# Patient Record
Sex: FEMALE | Race: WHITE | NOT HISPANIC OR LATINO | Employment: FULL TIME | URBAN - METROPOLITAN AREA
[De-identification: names, ages, dates, MRNs, and addresses within clinical notes are randomized per-mention and may not be internally consistent; named-entity substitution may affect disease eponyms.]

---

## 2023-03-13 DIAGNOSIS — Z79.4 TYPE 2 DIABETES MELLITUS WITH OTHER SPECIFIED COMPLICATION, WITH LONG-TERM CURRENT USE OF INSULIN (MULTI): Primary | ICD-10-CM

## 2023-03-13 DIAGNOSIS — E11.69 TYPE 2 DIABETES MELLITUS WITH OTHER SPECIFIED COMPLICATION, WITH LONG-TERM CURRENT USE OF INSULIN (MULTI): Primary | ICD-10-CM

## 2023-03-13 RX ORDER — METFORMIN HYDROCHLORIDE 500 MG/1
1 TABLET, EXTENDED RELEASE ORAL 2 TIMES DAILY
COMMUNITY
Start: 2017-07-24

## 2023-03-13 RX ORDER — MIRTAZAPINE 15 MG/1
1 TABLET, FILM COATED ORAL NIGHTLY
COMMUNITY
Start: 2022-04-27 | End: 2023-11-03 | Stop reason: SDUPTHER

## 2023-03-13 RX ORDER — TRAZODONE HYDROCHLORIDE 50 MG/1
TABLET ORAL
COMMUNITY
Start: 2022-04-20

## 2023-03-13 RX ORDER — CLOTRIMAZOLE AND BETAMETHASONE DIPROPIONATE 10; .5 MG/ML; MG/ML
LOTION TOPICAL
COMMUNITY
Start: 2023-01-30

## 2023-03-13 RX ORDER — DULOXETIN HYDROCHLORIDE 30 MG/1
CAPSULE, DELAYED RELEASE ORAL
COMMUNITY
Start: 2023-01-12

## 2023-03-13 RX ORDER — INSULIN LISPRO 100 [IU]/ML
INJECTION, SOLUTION INTRAVENOUS; SUBCUTANEOUS
COMMUNITY
Start: 2021-03-18

## 2023-03-13 RX ORDER — LISINOPRIL 10 MG/1
1 TABLET ORAL DAILY
COMMUNITY
Start: 2017-12-06

## 2023-03-13 RX ORDER — FLUTICASONE FUROATE AND VILANTEROL 200; 25 UG/1; UG/1
POWDER RESPIRATORY (INHALATION)
COMMUNITY
Start: 2020-09-24

## 2023-03-13 RX ORDER — BACITRACIN 500 [USP'U]/G
OINTMENT TOPICAL
COMMUNITY
Start: 2021-03-26

## 2023-03-13 RX ORDER — SERTRALINE HYDROCHLORIDE 50 MG/1
TABLET, FILM COATED ORAL
COMMUNITY
Start: 2023-01-12

## 2023-03-13 RX ORDER — LANOLIN ALCOHOL/MO/W.PET/CERES
1 CREAM (GRAM) TOPICAL DAILY
COMMUNITY
Start: 2020-09-24

## 2023-03-13 RX ORDER — RIMEGEPANT SULFATE 75 MG/75MG
TABLET, ORALLY DISINTEGRATING ORAL
COMMUNITY
Start: 2021-12-10 | End: 2024-03-20

## 2023-03-13 RX ORDER — BUSPIRONE HYDROCHLORIDE 5 MG/1
TABLET ORAL
COMMUNITY
Start: 2022-03-30

## 2023-03-13 RX ORDER — IBUPROFEN 600 MG/1
1 TABLET ORAL 3 TIMES DAILY PRN
COMMUNITY
Start: 2020-01-16 | End: 2023-07-10

## 2023-03-13 RX ORDER — ONDANSETRON 8 MG/1
TABLET, ORALLY DISINTEGRATING ORAL EVERY 8 HOURS
COMMUNITY
Start: 2019-03-14 | End: 2024-01-22

## 2023-03-13 RX ORDER — POTASSIUM CHLORIDE 20 MEQ/1
1 TABLET, EXTENDED RELEASE ORAL DAILY
COMMUNITY
Start: 2020-09-24

## 2023-03-13 RX ORDER — FLUOXETINE HYDROCHLORIDE 20 MG/1
CAPSULE ORAL
COMMUNITY
Start: 2022-04-20

## 2023-03-13 RX ORDER — GABAPENTIN 600 MG/1
1 TABLET ORAL 3 TIMES DAILY
COMMUNITY
Start: 2017-06-22

## 2023-03-13 RX ORDER — NYSTATIN 100000 U/G
OINTMENT TOPICAL
COMMUNITY
Start: 2023-01-13

## 2023-03-13 RX ORDER — INSULIN GLARGINE 100 [IU]/ML
INJECTION, SOLUTION SUBCUTANEOUS
COMMUNITY
Start: 2020-09-30 | End: 2023-12-13

## 2023-03-13 RX ORDER — TRIAMCINOLONE ACETONIDE 1 MG/G
CREAM TOPICAL
COMMUNITY
Start: 2022-08-01

## 2023-03-13 RX ORDER — DULAGLUTIDE 0.75 MG/.5ML
INJECTION, SOLUTION SUBCUTANEOUS
COMMUNITY
Start: 2021-03-18 | End: 2023-03-13 | Stop reason: SDUPTHER

## 2023-03-13 RX ORDER — DULAGLUTIDE 0.75 MG/.5ML
0.75 INJECTION, SOLUTION SUBCUTANEOUS
Qty: 4 EACH | Refills: 0 | Status: SHIPPED
Start: 2023-03-13 | End: 2023-11-03 | Stop reason: ALTCHOICE

## 2023-03-13 RX ORDER — TORSEMIDE 100 MG/1
1 TABLET ORAL DAILY
COMMUNITY
Start: 2022-08-04

## 2023-03-13 RX ORDER — SUMATRIPTAN SUCCINATE 100 MG/1
TABLET ORAL
COMMUNITY
Start: 2021-12-01

## 2023-03-13 RX ORDER — ECONAZOLE NITRATE 10 MG/G
CREAM TOPICAL 2 TIMES DAILY
COMMUNITY
Start: 2022-08-01

## 2023-03-13 RX ORDER — BLOOD-GLUCOSE METER
EACH MISCELLANEOUS
COMMUNITY
Start: 2017-04-04

## 2023-07-10 DIAGNOSIS — S93.419S: Primary | ICD-10-CM

## 2023-07-10 RX ORDER — IBUPROFEN 600 MG/1
TABLET ORAL
Qty: 90 TABLET | Refills: 0 | Status: SHIPPED | OUTPATIENT
Start: 2023-07-10 | End: 2023-11-03 | Stop reason: SDUPTHER

## 2023-08-29 ENCOUNTER — TELEPHONE (OUTPATIENT)
Dept: PRIMARY CARE | Facility: CLINIC | Age: 48
End: 2023-08-29
Payer: COMMERCIAL

## 2023-10-12 DIAGNOSIS — S93.419S: ICD-10-CM

## 2023-10-12 RX ORDER — IBUPROFEN 600 MG/1
TABLET ORAL
Qty: 90 TABLET | Refills: 0 | OUTPATIENT
Start: 2023-10-12

## 2023-11-03 ENCOUNTER — LAB (OUTPATIENT)
Dept: LAB | Facility: LAB | Age: 48
End: 2023-11-03
Payer: COMMERCIAL

## 2023-11-03 ENCOUNTER — OFFICE VISIT (OUTPATIENT)
Dept: PRIMARY CARE | Facility: CLINIC | Age: 48
End: 2023-11-03
Payer: COMMERCIAL

## 2023-11-03 VITALS
DIASTOLIC BLOOD PRESSURE: 84 MMHG | HEIGHT: 63 IN | SYSTOLIC BLOOD PRESSURE: 142 MMHG | WEIGHT: 235 LBS | BODY MASS INDEX: 41.64 KG/M2

## 2023-11-03 DIAGNOSIS — Z13.220 LIPID SCREENING: ICD-10-CM

## 2023-11-03 DIAGNOSIS — I10 BENIGN ESSENTIAL HYPERTENSION: ICD-10-CM

## 2023-11-03 DIAGNOSIS — E11.69 DIABETES MELLITUS TYPE 2 IN OBESE: ICD-10-CM

## 2023-11-03 DIAGNOSIS — N15.9 KIDNEY INFECTION: ICD-10-CM

## 2023-11-03 DIAGNOSIS — E07.9 THYROID DISEASE: ICD-10-CM

## 2023-11-03 DIAGNOSIS — S93.419S: ICD-10-CM

## 2023-11-03 DIAGNOSIS — E66.9 DIABETES MELLITUS TYPE 2 IN OBESE: ICD-10-CM

## 2023-11-03 DIAGNOSIS — N39.0 FREQUENT UTI: Primary | ICD-10-CM

## 2023-11-03 DIAGNOSIS — F32.A MILD DEPRESSION: ICD-10-CM

## 2023-11-03 PROBLEM — I47.10 PAROXYSMAL SUPRAVENTRICULAR TACHYCARDIA (CMS-HCC): Status: ACTIVE | Noted: 2023-11-03

## 2023-11-03 PROBLEM — L30.8 ASTEATOTIC ECZEMA: Status: ACTIVE | Noted: 2023-11-03

## 2023-11-03 PROBLEM — E86.0 DEHYDRATION: Status: ACTIVE | Noted: 2023-11-03

## 2023-11-03 PROBLEM — M75.41 IMPINGEMENT SYNDROME OF RIGHT SHOULDER: Status: ACTIVE | Noted: 2023-11-03

## 2023-11-03 PROBLEM — R07.81 RIB PAIN ON RIGHT SIDE: Status: ACTIVE | Noted: 2023-11-03

## 2023-11-03 PROBLEM — R05.9 COUGH: Status: ACTIVE | Noted: 2023-11-03

## 2023-11-03 PROBLEM — N63.10 LUMP OF BREAST, RIGHT: Status: ACTIVE | Noted: 2023-11-03

## 2023-11-03 PROBLEM — B37.9 YEAST INFECTION: Status: ACTIVE | Noted: 2023-11-03

## 2023-11-03 PROBLEM — A08.4 VIRAL GASTROENTERITIS: Status: ACTIVE | Noted: 2023-11-03

## 2023-11-03 PROBLEM — F43.20 ADJUSTMENT DISORDER: Status: ACTIVE | Noted: 2023-11-03

## 2023-11-03 PROBLEM — I82.409 DVT (DEEP VENOUS THROMBOSIS) (MULTI): Status: ACTIVE | Noted: 2023-11-03

## 2023-11-03 PROBLEM — B35.3 TINEA PEDIS: Status: ACTIVE | Noted: 2023-11-03

## 2023-11-03 PROBLEM — N92.0 MENORRHAGIA: Status: ACTIVE | Noted: 2023-11-03

## 2023-11-03 PROBLEM — M54.2 NECK PAIN: Status: ACTIVE | Noted: 2023-11-03

## 2023-11-03 PROBLEM — J90 BILATERAL PLEURAL EFFUSION: Status: ACTIVE | Noted: 2023-11-03

## 2023-11-03 PROBLEM — E11.42 DIABETIC POLYNEUROPATHY ASSOCIATED WITH TYPE 2 DIABETES MELLITUS (MULTI): Status: ACTIVE | Noted: 2023-11-03

## 2023-11-03 PROBLEM — F41.9 ANXIETY: Status: ACTIVE | Noted: 2023-11-03

## 2023-11-03 PROBLEM — J98.11 ATELECTASIS: Status: ACTIVE | Noted: 2023-11-03

## 2023-11-03 PROBLEM — M54.9 BACK PAIN: Status: ACTIVE | Noted: 2023-11-03

## 2023-11-03 PROBLEM — R06.00 DYSPNEA: Status: ACTIVE | Noted: 2023-11-03

## 2023-11-03 PROBLEM — N93.9 ABNORMAL VAGINAL BLEEDING: Status: ACTIVE | Noted: 2023-11-03

## 2023-11-03 PROBLEM — R60.0 EDEMA OF FOOT: Status: ACTIVE | Noted: 2023-11-03

## 2023-11-03 PROBLEM — N10 ACUTE PYELONEPHRITIS: Status: ACTIVE | Noted: 2023-11-03

## 2023-11-03 PROBLEM — E11.9 DIABETES MELLITUS (MULTI): Status: ACTIVE | Noted: 2023-11-03

## 2023-11-03 PROBLEM — F89 DISABILITY, DEVELOPMENTAL: Status: ACTIVE | Noted: 2023-11-03

## 2023-11-03 PROBLEM — J11.1 INFLUENZA-LIKE ILLNESS: Status: ACTIVE | Noted: 2023-11-03

## 2023-11-03 PROBLEM — M25.512 LEFT SHOULDER PAIN: Status: ACTIVE | Noted: 2020-03-24

## 2023-11-03 PROBLEM — Z86.718 HISTORY OF DEEP VEIN THROMBOSIS: Status: ACTIVE | Noted: 2023-11-03

## 2023-11-03 PROBLEM — R51.9 HEADACHE: Status: ACTIVE | Noted: 2023-11-03

## 2023-11-03 PROBLEM — M75.42 IMPINGEMENT SYNDROME OF LEFT SHOULDER: Status: ACTIVE | Noted: 2023-11-03

## 2023-11-03 PROBLEM — M48.062 SPINAL STENOSIS, LUMBAR REGION WITH NEUROGENIC CLAUDICATION: Status: ACTIVE | Noted: 2020-03-10

## 2023-11-03 PROBLEM — L03.119 CELLULITIS OF LOWER LEG: Status: ACTIVE | Noted: 2023-11-03

## 2023-11-03 PROBLEM — R59.9 LYMPH NODES ENLARGED: Status: ACTIVE | Noted: 2023-11-03

## 2023-11-03 PROBLEM — J18.9 COMMUNITY ACQUIRED PNEUMONIA: Status: ACTIVE | Noted: 2023-11-03

## 2023-11-03 PROBLEM — M79.606 LEG PAIN: Status: ACTIVE | Noted: 2023-11-03

## 2023-11-03 PROBLEM — R93.89 STANDARD CHEST X-RAY ABNORMAL: Status: ACTIVE | Noted: 2023-11-03

## 2023-11-03 PROBLEM — D64.9 ANEMIA: Status: ACTIVE | Noted: 2023-11-03

## 2023-11-03 PROBLEM — G56.00 CARPAL TUNNEL SYNDROME: Status: ACTIVE | Noted: 2023-11-03

## 2023-11-03 PROBLEM — R60.0 EDEMA OF LOWER EXTREMITY: Status: ACTIVE | Noted: 2023-11-03

## 2023-11-03 PROBLEM — M54.12 CERVICAL RADICULITIS: Status: ACTIVE | Noted: 2023-11-03

## 2023-11-03 PROBLEM — R73.09 BLOOD GLUCOSE ABNORMAL: Status: ACTIVE | Noted: 2023-11-03

## 2023-11-03 PROBLEM — R60.9 EDEMA: Status: ACTIVE | Noted: 2023-11-03

## 2023-11-03 PROBLEM — R11.15 PERSISTENT VOMITING: Status: ACTIVE | Noted: 2023-11-03

## 2023-11-03 PROBLEM — G43.909 MIGRAINE: Status: ACTIVE | Noted: 2023-11-03

## 2023-11-03 LAB
ALBUMIN SERPL BCP-MCNC: 4.2 G/DL (ref 3.4–5)
ALP SERPL-CCNC: 155 U/L (ref 33–110)
ALT SERPL W P-5'-P-CCNC: 25 U/L (ref 7–45)
ANION GAP SERPL CALC-SCNC: 13 MMOL/L (ref 10–20)
APPEARANCE UR: CLEAR
AST SERPL W P-5'-P-CCNC: 12 U/L (ref 9–39)
BACTERIA #/AREA URNS AUTO: ABNORMAL /HPF
BILIRUB SERPL-MCNC: 0.3 MG/DL (ref 0–1.2)
BILIRUB UR STRIP.AUTO-MCNC: NEGATIVE MG/DL
BUN SERPL-MCNC: 22 MG/DL (ref 6–23)
CALCIUM SERPL-MCNC: 9.9 MG/DL (ref 8.6–10.6)
CHLORIDE SERPL-SCNC: 100 MMOL/L (ref 98–107)
CO2 SERPL-SCNC: 27 MMOL/L (ref 21–32)
COLOR UR: YELLOW
CREAT SERPL-MCNC: 1.05 MG/DL (ref 0.5–1.05)
ERYTHROCYTE [DISTWIDTH] IN BLOOD BY AUTOMATED COUNT: 12.6 % (ref 11.5–14.5)
EST. AVERAGE GLUCOSE BLD GHB EST-MCNC: 298 MG/DL
GFR SERPL CREATININE-BSD FRML MDRD: 66 ML/MIN/1.73M*2
GLUCOSE SERPL-MCNC: 476 MG/DL (ref 74–99)
GLUCOSE UR STRIP.AUTO-MCNC: ABNORMAL MG/DL
HBA1C MFR BLD: 12 %
HCT VFR BLD AUTO: 38.5 % (ref 36–46)
HGB BLD-MCNC: 12.6 G/DL (ref 12–16)
KETONES UR STRIP.AUTO-MCNC: NEGATIVE MG/DL
LEUKOCYTE ESTERASE UR QL STRIP.AUTO: NEGATIVE
MCH RBC QN AUTO: 28.5 PG (ref 26–34)
MCHC RBC AUTO-ENTMCNC: 32.7 G/DL (ref 32–36)
MCV RBC AUTO: 87 FL (ref 80–100)
NITRITE UR QL STRIP.AUTO: NEGATIVE
NRBC BLD-RTO: 0 /100 WBCS (ref 0–0)
PH UR STRIP.AUTO: 6 [PH]
PLATELET # BLD AUTO: 197 X10*3/UL (ref 150–450)
POTASSIUM SERPL-SCNC: 4.3 MMOL/L (ref 3.5–5.3)
PROT SERPL-MCNC: 7.7 G/DL (ref 6.4–8.2)
PROT UR STRIP.AUTO-MCNC: ABNORMAL MG/DL
RBC # BLD AUTO: 4.42 X10*6/UL (ref 4–5.2)
RBC # UR STRIP.AUTO: NEGATIVE /UL
RBC #/AREA URNS AUTO: ABNORMAL /HPF
SODIUM SERPL-SCNC: 136 MMOL/L (ref 136–145)
SP GR UR STRIP.AUTO: 1.03
SQUAMOUS #/AREA URNS AUTO: ABNORMAL /HPF
TSH SERPL-ACNC: 1.05 MIU/L (ref 0.44–3.98)
UROBILINOGEN UR STRIP.AUTO-MCNC: <2 MG/DL
WBC # BLD AUTO: 6.7 X10*3/UL (ref 4.4–11.3)
WBC #/AREA URNS AUTO: ABNORMAL /HPF

## 2023-11-03 PROCEDURE — 3077F SYST BP >= 140 MM HG: CPT | Performed by: INTERNAL MEDICINE

## 2023-11-03 PROCEDURE — 99214 OFFICE O/P EST MOD 30 MIN: CPT | Performed by: INTERNAL MEDICINE

## 2023-11-03 PROCEDURE — 36415 COLL VENOUS BLD VENIPUNCTURE: CPT

## 2023-11-03 PROCEDURE — 4010F ACE/ARB THERAPY RXD/TAKEN: CPT | Performed by: INTERNAL MEDICINE

## 2023-11-03 PROCEDURE — 84443 ASSAY THYROID STIM HORMONE: CPT

## 2023-11-03 PROCEDURE — 3079F DIAST BP 80-89 MM HG: CPT | Performed by: INTERNAL MEDICINE

## 2023-11-03 PROCEDURE — 83036 HEMOGLOBIN GLYCOSYLATED A1C: CPT

## 2023-11-03 PROCEDURE — 81001 URINALYSIS AUTO W/SCOPE: CPT

## 2023-11-03 PROCEDURE — 80053 COMPREHEN METABOLIC PANEL: CPT

## 2023-11-03 PROCEDURE — 85027 COMPLETE CBC AUTOMATED: CPT

## 2023-11-03 PROCEDURE — 3046F HEMOGLOBIN A1C LEVEL >9.0%: CPT | Performed by: INTERNAL MEDICINE

## 2023-11-03 RX ORDER — DULAGLUTIDE 1.5 MG/.5ML
1.5 INJECTION, SOLUTION SUBCUTANEOUS
Qty: 2 ML | Refills: 11 | Status: SHIPPED | OUTPATIENT
Start: 2023-11-03

## 2023-11-03 RX ORDER — IBUPROFEN 600 MG/1
600 TABLET ORAL 3 TIMES DAILY
Qty: 90 TABLET | Refills: 0 | Status: SHIPPED | OUTPATIENT
Start: 2023-11-03

## 2023-11-03 RX ORDER — MIRTAZAPINE 15 MG/1
15 TABLET, FILM COATED ORAL NIGHTLY
Qty: 30 TABLET | Refills: 0 | Status: SHIPPED | OUTPATIENT
Start: 2023-11-03 | End: 2023-12-13

## 2023-11-03 RX ORDER — CIPROFLOXACIN 500 MG/1
500 TABLET ORAL 2 TIMES DAILY
Qty: 20 TABLET | Refills: 0 | Status: SHIPPED | OUTPATIENT
Start: 2023-11-03 | End: 2023-11-13

## 2023-11-03 ASSESSMENT — ENCOUNTER SYMPTOMS
DEPRESSION: 0
LOSS OF SENSATION IN FEET: 0
OCCASIONAL FEELINGS OF UNSTEADINESS: 0

## 2023-11-04 NOTE — PROGRESS NOTES
"Subjective   Patient ID: Yuly Pond is a 47 y.o. female who presents for multiple medical issues.    Yuly Pond today came here for multiple medical issues.  1. She has pain and swelling in right CVA angle, red, inflamed, tender.  Increased frequency, burning in urine, feeling weak, tired, fatigued, exhausted.  2. She wants to increase the Trulicity dose.  Overall okay, much better.    I have personally reviewed the patient's Past Medical History, Medications, Allergies, Social History, and Family History in the EMR.    Review of Systems   All other systems reviewed and are negative.    Objective   /84   Ht 1.6 m (5' 3\")   Wt 107 kg (235 lb)   BMI 41.63 kg/m²     Physical Exam  Vitals reviewed.   Cardiovascular:      Heart sounds: Normal heart sounds, S1 normal and S2 normal. No murmur heard.     No friction rub.   Pulmonary:      Effort: Pulmonary effort is normal.      Breath sounds: Normal breath sounds and air entry.   Abdominal:      Comments: Right ______ tenderness.   Musculoskeletal:      Right lower leg: No edema.      Left lower leg: No edema.   Lymphadenopathy:      Lower Body: No right inguinal adenopathy. No left inguinal adenopathy.   Neurological:      Cranial Nerves: Cranial nerves 2-12 are intact.      Sensory: No sensory deficit.      Motor: Motor function is intact.      Deep Tendon Reflexes: Reflexes are normal and symmetric.     LAB WORK: Laboratory testing ordered.    Assessment/Plan   Problem List Items Addressed This Visit             ICD-10-CM       Cardiac and Vasculature    Benign essential hypertension I10    Relevant Orders    CBC (Completed)    TSH (Completed)       Endocrine/Metabolic    Diabetes mellitus type 2 in obese (CMS/[facility]) E11.69, E66.9    Relevant Medications    dulaglutide (Trulicity) 1.5 mg/0.5 mL pen injector injection    Other Relevant Orders    Hemoglobin A1c (Completed)       Genitourinary and Reproductive    Frequent UTI - Primary N39.0       Mental Health "    Mild depression F32.A    Relevant Medications    mirtazapine (Remeron) 15 mg tablet     Other Visit Diagnoses         Codes    Sprain of calcaneofibular ligament of ankle, unspecified laterality, sequela     S93.419S    Relevant Medications    ibuprofen 600 mg tablet    Kidney infection     N15.9    Relevant Medications    ciprofloxacin (Cipro) 500 mg tablet    Other Relevant Orders    Urinalysis with Reflex Microscopic (Completed)    Lipid screening     Z13.220    Relevant Orders    Comprehensive metabolic panel (Completed)    Thyroid disease     E07.9        1. UTI, pyelonephritis.  UA ordered.  Cipro given.  2. Type 2 diabetes.  I am going to increase the dose of Trulicity.  3. Complete blood work ordered.  4. Thyroid.  Blood work ordered.  5. I shall see her in a week after tests.    Scribe Attestation  By signing my name below, I, Vivienne Leone attest that this documentation has been prepared under the direction and in the presence of Mandeep Purvis MD.

## 2024-04-10 DIAGNOSIS — G43.001 MIGRAINE WITHOUT AURA AND WITH STATUS MIGRAINOSUS, NOT INTRACTABLE: ICD-10-CM

## 2024-07-11 DIAGNOSIS — E08.00 DIABETES MELLITUS DUE TO UNDERLYING CONDITION WITH HYPEROSMOLARITY WITHOUT COMA, WITHOUT LONG-TERM CURRENT USE OF INSULIN (MULTI): ICD-10-CM

## 2024-07-11 RX ORDER — INSULIN GLARGINE 100 [IU]/ML
INJECTION, SOLUTION SUBCUTANEOUS
Qty: 45 ML | Refills: 0 | OUTPATIENT
Start: 2024-07-11

## 2024-07-19 DIAGNOSIS — E13.9 OTHER SPECIFIED DIABETES MELLITUS WITHOUT COMPLICATION, WITHOUT LONG-TERM CURRENT USE OF INSULIN (MULTI): ICD-10-CM

## 2024-07-19 DIAGNOSIS — G43.001 MIGRAINE WITHOUT AURA AND WITH STATUS MIGRAINOSUS, NOT INTRACTABLE: ICD-10-CM

## 2024-07-19 DIAGNOSIS — S93.419S: ICD-10-CM

## 2024-07-19 RX ORDER — IBUPROFEN 600 MG/1
600 TABLET ORAL 3 TIMES DAILY
Qty: 90 TABLET | Refills: 0 | Status: SHIPPED | OUTPATIENT
Start: 2024-07-19

## 2024-07-23 RX ORDER — INSULIN LISPRO 100 [IU]/ML
25 INJECTION, SOLUTION INTRAVENOUS; SUBCUTANEOUS 2 TIMES DAILY
Qty: 15 ML | Refills: 0 | Status: SHIPPED | OUTPATIENT
Start: 2024-07-23 | End: 2024-08-22

## 2024-08-05 DIAGNOSIS — E08.00 DIABETES MELLITUS DUE TO UNDERLYING CONDITION WITH HYPEROSMOLARITY WITHOUT COMA, WITHOUT LONG-TERM CURRENT USE OF INSULIN (MULTI): ICD-10-CM

## 2024-08-05 RX ORDER — INSULIN GLARGINE 100 [IU]/ML
80 INJECTION, SOLUTION SUBCUTANEOUS DAILY
Qty: 24 ML | Refills: 0 | Status: SHIPPED | OUTPATIENT
Start: 2024-08-05 | End: 2024-09-04

## 2024-09-10 ENCOUNTER — LAB (OUTPATIENT)
Dept: LAB | Facility: LAB | Age: 49
End: 2024-09-10
Payer: COMMERCIAL

## 2024-09-10 ENCOUNTER — APPOINTMENT (OUTPATIENT)
Dept: PRIMARY CARE | Facility: CLINIC | Age: 49
End: 2024-09-10
Payer: COMMERCIAL

## 2024-09-10 VITALS
HEIGHT: 63 IN | DIASTOLIC BLOOD PRESSURE: 86 MMHG | WEIGHT: 223 LBS | SYSTOLIC BLOOD PRESSURE: 142 MMHG | BODY MASS INDEX: 39.51 KG/M2

## 2024-09-10 DIAGNOSIS — G43.001 MIGRAINE WITHOUT AURA AND WITH STATUS MIGRAINOSUS, NOT INTRACTABLE: ICD-10-CM

## 2024-09-10 DIAGNOSIS — R73.09 BLOOD GLUCOSE ABNORMAL: ICD-10-CM

## 2024-09-10 DIAGNOSIS — I10 BENIGN ESSENTIAL HYPERTENSION: ICD-10-CM

## 2024-09-10 DIAGNOSIS — Z12.31 VISIT FOR SCREENING MAMMOGRAM: Primary | ICD-10-CM

## 2024-09-10 DIAGNOSIS — R10.84 GENERALIZED ABDOMINAL PAIN: ICD-10-CM

## 2024-09-10 DIAGNOSIS — S93.419S: ICD-10-CM

## 2024-09-10 DIAGNOSIS — E08.00 DIABETES MELLITUS DUE TO UNDERLYING CONDITION WITH HYPEROSMOLARITY WITHOUT COMA, WITHOUT LONG-TERM CURRENT USE OF INSULIN (MULTI): ICD-10-CM

## 2024-09-10 DIAGNOSIS — K57.90 DIVERTICULOSIS: ICD-10-CM

## 2024-09-10 DIAGNOSIS — K57.92 DIVERTICULITIS: ICD-10-CM

## 2024-09-10 DIAGNOSIS — Z13.220 LIPID SCREENING: ICD-10-CM

## 2024-09-10 DIAGNOSIS — E10.9 TYPE 1 DIABETES MELLITUS WITHOUT COMPLICATION (MULTI): ICD-10-CM

## 2024-09-10 DIAGNOSIS — F32.A MILD DEPRESSION: ICD-10-CM

## 2024-09-10 PROCEDURE — 36415 COLL VENOUS BLD VENIPUNCTURE: CPT

## 2024-09-10 PROCEDURE — 85027 COMPLETE CBC AUTOMATED: CPT

## 2024-09-10 PROCEDURE — 83690 ASSAY OF LIPASE: CPT

## 2024-09-10 PROCEDURE — 3008F BODY MASS INDEX DOCD: CPT | Performed by: INTERNAL MEDICINE

## 2024-09-10 PROCEDURE — 3052F HG A1C>EQUAL 8.0%<EQUAL 9.0%: CPT | Performed by: INTERNAL MEDICINE

## 2024-09-10 PROCEDURE — 3079F DIAST BP 80-89 MM HG: CPT | Performed by: INTERNAL MEDICINE

## 2024-09-10 PROCEDURE — 83036 HEMOGLOBIN GLYCOSYLATED A1C: CPT

## 2024-09-10 PROCEDURE — 3077F SYST BP >= 140 MM HG: CPT | Performed by: INTERNAL MEDICINE

## 2024-09-10 PROCEDURE — 80053 COMPREHEN METABOLIC PANEL: CPT

## 2024-09-10 PROCEDURE — 99214 OFFICE O/P EST MOD 30 MIN: CPT | Performed by: INTERNAL MEDICINE

## 2024-09-10 PROCEDURE — 82150 ASSAY OF AMYLASE: CPT

## 2024-09-10 PROCEDURE — 4010F ACE/ARB THERAPY RXD/TAKEN: CPT | Performed by: INTERNAL MEDICINE

## 2024-09-10 RX ORDER — INSULIN GLARGINE 100 [IU]/ML
80 INJECTION, SOLUTION SUBCUTANEOUS DAILY
Qty: 24 ML | Refills: 0 | Status: SHIPPED | OUTPATIENT
Start: 2024-09-10 | End: 2024-10-10

## 2024-09-10 RX ORDER — DULAGLUTIDE 1.5 MG/.5ML
1.5 INJECTION, SOLUTION SUBCUTANEOUS
Qty: 2 ML | Refills: 11 | Status: SHIPPED | OUTPATIENT
Start: 2024-09-10

## 2024-09-10 RX ORDER — MIRTAZAPINE 15 MG/1
15 TABLET, FILM COATED ORAL NIGHTLY
Qty: 30 TABLET | Refills: 0 | Status: SHIPPED | OUTPATIENT
Start: 2024-09-10

## 2024-09-10 RX ORDER — CIPROFLOXACIN 500 MG/1
500 TABLET ORAL 2 TIMES DAILY
Qty: 20 TABLET | Refills: 0 | Status: SHIPPED | OUTPATIENT
Start: 2024-09-10 | End: 2024-09-20

## 2024-09-10 RX ORDER — IBUPROFEN 600 MG/1
600 TABLET ORAL 3 TIMES DAILY
Qty: 90 TABLET | Refills: 0 | Status: SHIPPED | OUTPATIENT
Start: 2024-09-10

## 2024-09-10 RX ORDER — METRONIDAZOLE 500 MG/1
500 TABLET ORAL 3 TIMES DAILY
Qty: 30 TABLET | Refills: 0 | Status: SHIPPED | OUTPATIENT
Start: 2024-09-10 | End: 2024-09-20

## 2024-09-10 ASSESSMENT — ENCOUNTER SYMPTOMS
DIARRHEA: 1
FLANK PAIN: 1

## 2024-09-11 LAB
ALBUMIN SERPL BCP-MCNC: 4.4 G/DL (ref 3.4–5)
ALP SERPL-CCNC: 133 U/L (ref 33–110)
ALT SERPL W P-5'-P-CCNC: 29 U/L (ref 7–45)
AMYLASE SERPL-CCNC: 23 U/L (ref 29–103)
ANION GAP SERPL CALC-SCNC: 14 MMOL/L (ref 10–20)
AST SERPL W P-5'-P-CCNC: 19 U/L (ref 9–39)
BILIRUB SERPL-MCNC: 0.4 MG/DL (ref 0–1.2)
BUN SERPL-MCNC: 21 MG/DL (ref 6–23)
CALCIUM SERPL-MCNC: 9.7 MG/DL (ref 8.6–10.6)
CHLORIDE SERPL-SCNC: 103 MMOL/L (ref 98–107)
CO2 SERPL-SCNC: 25 MMOL/L (ref 21–32)
CREAT SERPL-MCNC: 0.84 MG/DL (ref 0.5–1.05)
EGFRCR SERPLBLD CKD-EPI 2021: 85 ML/MIN/1.73M*2
ERYTHROCYTE [DISTWIDTH] IN BLOOD BY AUTOMATED COUNT: 13 % (ref 11.5–14.5)
EST. AVERAGE GLUCOSE BLD GHB EST-MCNC: 212 MG/DL
GLUCOSE SERPL-MCNC: 185 MG/DL (ref 74–99)
HBA1C MFR BLD: 9 %
HCT VFR BLD AUTO: 35.5 % (ref 36–46)
HGB BLD-MCNC: 11.3 G/DL (ref 12–16)
LIPASE SERPL-CCNC: 33 U/L (ref 9–82)
MCH RBC QN AUTO: 28.4 PG (ref 26–34)
MCHC RBC AUTO-ENTMCNC: 31.8 G/DL (ref 32–36)
MCV RBC AUTO: 89 FL (ref 80–100)
NRBC BLD-RTO: 0 /100 WBCS (ref 0–0)
PLATELET # BLD AUTO: 193 X10*3/UL (ref 150–450)
POTASSIUM SERPL-SCNC: 3.9 MMOL/L (ref 3.5–5.3)
PROT SERPL-MCNC: 7.4 G/DL (ref 6.4–8.2)
RBC # BLD AUTO: 3.98 X10*6/UL (ref 4–5.2)
SODIUM SERPL-SCNC: 138 MMOL/L (ref 136–145)
WBC # BLD AUTO: 5.9 X10*3/UL (ref 4.4–11.3)

## 2024-09-11 NOTE — PROGRESS NOTES
"Subjective   Patient ID: Yuly Pond is a 48 y.o. female who presents for Follow-up, Flank Pain, and Diarrhea.    Yuly Pond today came here for multiple medical issues.  Lower abdominal pain, nausea, dyspepsia, diarrhea going on for last several days.  Over-the-counter medications not helping.  She told me her blood sugars are much better.  She is going less for urine.  Weakness, tired, fatigued, exhausted, feeling better.    I have personally reviewed the patient's Past Medical History, Medications, Allergies, Social History, and Family History in the EMR.    Flank Pain    Diarrhea     Review of Systems   Gastrointestinal:  Positive for diarrhea.   Genitourinary:  Positive for flank pain.   All other systems reviewed and are negative.    Objective   /86   Ht 1.6 m (5' 3\")   Wt 101 kg (223 lb)   BMI 39.50 kg/m²     Physical Exam  Vitals reviewed.   Cardiovascular:      Heart sounds: Normal heart sounds, S1 normal and S2 normal. No murmur heard.     No friction rub.   Pulmonary:      Effort: Pulmonary effort is normal.      Breath sounds: Normal breath sounds and air entry.   Abdominal:      Palpations: There is no hepatomegaly, splenomegaly or mass.      Comments: Abdomen tenderness.  Bowel sounds present.   Musculoskeletal:      Right lower leg: No edema.      Left lower leg: No edema.   Lymphadenopathy:      Lower Body: No right inguinal adenopathy. No left inguinal adenopathy.   Neurological:      Cranial Nerves: Cranial nerves 2-12 are intact.      Sensory: No sensory deficit.      Motor: Motor function is intact.      Deep Tendon Reflexes: Reflexes are normal and symmetric.     LAB WORK: Laboratory testing discussed.    Assessment/Plan   Problem List Items Addressed This Visit             ICD-10-CM       Cardiac and Vasculature    Benign essential hypertension I10    Relevant Orders    CBC       Endocrine/Metabolic    Blood glucose abnormal R73.09    Relevant Medications    dulaglutide (Trulicity) " 1.5 mg/0.5 mL pen injector injection    Diabetes mellitus (Multi) E11.9    Relevant Medications    insulin glargine (Basaglar KwikPen U-100 Insulin) 100 unit/mL (3 mL) pen    Other Relevant Orders    Hemoglobin A1C       Mental Health    Mild depression F32.A    Relevant Medications    mirtazapine (Remeron) 15 mg tablet       Neuro    Migraine G43.909    Relevant Medications    rimegepant (Nurtec ODT) 75 mg tablet,disintegrating     Other Visit Diagnoses         Codes    Visit for screening mammogram    -  Primary Z12.31    Relevant Orders    BI mammo bilateral screening tomosynthesis    Sprain of calcaneofibular ligament of ankle, unspecified laterality, sequela     S93.419S    Relevant Medications    ibuprofen 600 mg tablet    Lipid screening     Z13.220    Relevant Orders    Comprehensive Metabolic Panel    Generalized abdominal pain     R10.84    Relevant Medications    ciprofloxacin (Cipro) 500 mg tablet    metroNIDAZOLE (Flagyl) 500 mg tablet    Other Relevant Orders    CT abdomen pelvis w IV contrast    Lipase    Amylase    Diverticulosis     K57.90    Relevant Medications    ciprofloxacin (Cipro) 500 mg tablet    metroNIDAZOLE (Flagyl) 500 mg tablet    Other Relevant Orders    CT abdomen pelvis w IV contrast    Lipase    Amylase    Diverticulitis     K57.92        1. Acute diverticulosis, diverticulitis.  We discussed CT scan, Cipro, Flagyl.  2. Type 1 diabetic.  We will check hemoglobin A1c.  3. Hypertension, okay.  4. Cholesterol, stable.  5. Blood work ordered.  6. I urged her to see me in a week after tests.  7. Continue to follow.    Scribe Attestation  By signing my name below, I, Vivienne Leone attest that this documentation has been prepared under the direction and in the presence of Mandeep Purvis MD.

## 2024-10-11 ENCOUNTER — HOSPITAL ENCOUNTER (OUTPATIENT)
Dept: RADIOLOGY | Facility: HOSPITAL | Age: 49
Discharge: HOME | End: 2024-10-11
Payer: COMMERCIAL

## 2024-10-11 VITALS — HEIGHT: 69 IN | WEIGHT: 223 LBS | BODY MASS INDEX: 33.03 KG/M2

## 2024-10-11 DIAGNOSIS — Z12.31 VISIT FOR SCREENING MAMMOGRAM: ICD-10-CM

## 2024-10-11 DIAGNOSIS — K57.90 DIVERTICULOSIS: ICD-10-CM

## 2024-10-11 DIAGNOSIS — R10.84 GENERALIZED ABDOMINAL PAIN: ICD-10-CM

## 2024-10-11 PROCEDURE — 2550000001 HC RX 255 CONTRASTS: Performed by: INTERNAL MEDICINE

## 2024-10-11 PROCEDURE — 74177 CT ABD & PELVIS W/CONTRAST: CPT

## 2024-10-11 PROCEDURE — 77063 BREAST TOMOSYNTHESIS BI: CPT

## 2024-10-17 ENCOUNTER — OFFICE VISIT (OUTPATIENT)
Dept: PRIMARY CARE | Facility: CLINIC | Age: 49
End: 2024-10-17
Payer: COMMERCIAL

## 2024-10-17 VITALS
HEIGHT: 63 IN | DIASTOLIC BLOOD PRESSURE: 88 MMHG | SYSTOLIC BLOOD PRESSURE: 156 MMHG | WEIGHT: 227 LBS | BODY MASS INDEX: 40.22 KG/M2

## 2024-10-17 DIAGNOSIS — R10.9 LEFT FLANK PAIN: Primary | ICD-10-CM

## 2024-10-17 DIAGNOSIS — D73.5 SPLENIC INFARCT: ICD-10-CM

## 2024-10-17 DIAGNOSIS — I74.8 THROMBOSIS OF SPLENIC ARTERY (MULTI): ICD-10-CM

## 2024-10-17 DIAGNOSIS — R92.8 MAMMOGRAM ABNORMAL: ICD-10-CM

## 2024-10-17 DIAGNOSIS — R52 PAIN: ICD-10-CM

## 2024-10-17 PROBLEM — Z20.822 CONTACT WITH AND (SUSPECTED) EXPOSURE TO COVID-19: Status: ACTIVE | Noted: 2022-07-29

## 2024-10-17 PROBLEM — N23 RENAL PAIN: Status: ACTIVE | Noted: 2024-10-17

## 2024-10-17 PROBLEM — L98.9 INFLAMMATORY DERMATOSIS: Status: ACTIVE | Noted: 2022-07-29

## 2024-10-17 PROCEDURE — 3046F HEMOGLOBIN A1C LEVEL >9.0%: CPT | Performed by: INTERNAL MEDICINE

## 2024-10-17 PROCEDURE — 3077F SYST BP >= 140 MM HG: CPT | Performed by: INTERNAL MEDICINE

## 2024-10-17 PROCEDURE — 99214 OFFICE O/P EST MOD 30 MIN: CPT | Performed by: INTERNAL MEDICINE

## 2024-10-17 PROCEDURE — 3079F DIAST BP 80-89 MM HG: CPT | Performed by: INTERNAL MEDICINE

## 2024-10-17 PROCEDURE — 3008F BODY MASS INDEX DOCD: CPT | Performed by: INTERNAL MEDICINE

## 2024-10-17 PROCEDURE — 4010F ACE/ARB THERAPY RXD/TAKEN: CPT | Performed by: INTERNAL MEDICINE

## 2024-10-17 RX ORDER — LIDOCAINE 50 MG/G
1 PATCH TOPICAL DAILY
Qty: 30 PATCH | Refills: 1 | Status: SHIPPED | OUTPATIENT
Start: 2024-10-17 | End: 2025-10-17

## 2024-10-17 RX ORDER — DEXTROMETHORPHAN HYDROBROMIDE, GUAIFENESIN 5; 100 MG/5ML; MG/5ML
650 LIQUID ORAL EVERY 8 HOURS PRN
Qty: 30 TABLET | Refills: 0 | Status: SHIPPED | OUTPATIENT
Start: 2024-10-17 | End: 2024-10-27

## 2024-10-18 ENCOUNTER — LAB (OUTPATIENT)
Dept: LAB | Facility: LAB | Age: 49
End: 2024-10-18
Payer: COMMERCIAL

## 2024-10-18 ENCOUNTER — HOSPITAL ENCOUNTER (OUTPATIENT)
Dept: RADIOLOGY | Facility: HOSPITAL | Age: 49
Discharge: HOME | End: 2024-10-18
Payer: COMMERCIAL

## 2024-10-18 DIAGNOSIS — I74.8 THROMBOSIS OF SPLENIC ARTERY (MULTI): ICD-10-CM

## 2024-10-18 LAB
ALBUMIN SERPL BCP-MCNC: 3.8 G/DL (ref 3.4–5)
ALP SERPL-CCNC: 143 U/L (ref 33–110)
ALT SERPL W P-5'-P-CCNC: 22 U/L (ref 7–45)
ANION GAP SERPL CALCULATED.3IONS-SCNC: 9 MMOL/L (ref 10–20)
AST SERPL W P-5'-P-CCNC: 14 U/L (ref 9–39)
BILIRUB SERPL-MCNC: 0.4 MG/DL (ref 0–1.2)
BUN SERPL-MCNC: 23 MG/DL (ref 6–23)
CALCIUM SERPL-MCNC: 9.6 MG/DL (ref 8.6–10.3)
CHLORIDE SERPL-SCNC: 102 MMOL/L (ref 98–107)
CO2 SERPL-SCNC: 27 MMOL/L (ref 21–32)
CREAT SERPL-MCNC: 1.17 MG/DL (ref 0.5–1.05)
EGFRCR SERPLBLD CKD-EPI 2021: 57 ML/MIN/1.73M*2
ERYTHROCYTE [DISTWIDTH] IN BLOOD BY AUTOMATED COUNT: 12.4 % (ref 11.5–14.5)
ERYTHROCYTE [SEDIMENTATION RATE] IN BLOOD BY WESTERGREN METHOD: 40 MM/H (ref 0–20)
EST. AVERAGE GLUCOSE BLD GHB EST-MCNC: 226 MG/DL
GLUCOSE SERPL-MCNC: 263 MG/DL (ref 74–99)
HBA1C MFR BLD: 9.5 %
HCT VFR BLD AUTO: 33.6 % (ref 36–46)
HGB BLD-MCNC: 11.2 G/DL (ref 12–16)
MCH RBC QN AUTO: 28.7 PG (ref 26–34)
MCHC RBC AUTO-ENTMCNC: 33.3 G/DL (ref 32–36)
MCV RBC AUTO: 86 FL (ref 80–100)
NRBC BLD-RTO: 0 /100 WBCS (ref 0–0)
PLATELET # BLD AUTO: 186 X10*3/UL (ref 150–450)
POTASSIUM SERPL-SCNC: 4.2 MMOL/L (ref 3.5–5.3)
PROT SERPL-MCNC: 6.7 G/DL (ref 6.4–8.2)
RBC # BLD AUTO: 3.9 X10*6/UL (ref 4–5.2)
RHEUMATOID FACT SER NEPH-ACNC: <10 IU/ML (ref 0–15)
SODIUM SERPL-SCNC: 134 MMOL/L (ref 136–145)
URATE SERPL-MCNC: 5.5 MG/DL (ref 2.3–6.7)
WBC # BLD AUTO: 5.4 X10*3/UL (ref 4.4–11.3)

## 2024-10-18 PROCEDURE — 36415 COLL VENOUS BLD VENIPUNCTURE: CPT

## 2024-10-18 PROCEDURE — 86038 ANTINUCLEAR ANTIBODIES: CPT

## 2024-10-18 PROCEDURE — 84550 ASSAY OF BLOOD/URIC ACID: CPT

## 2024-10-18 PROCEDURE — 74174 CTA ABD&PLVS W/CONTRAST: CPT

## 2024-10-18 PROCEDURE — 83036 HEMOGLOBIN GLYCOSYLATED A1C: CPT

## 2024-10-18 PROCEDURE — 2550000001 HC RX 255 CONTRASTS: Performed by: INTERNAL MEDICINE

## 2024-10-18 PROCEDURE — 80053 COMPREHEN METABOLIC PANEL: CPT

## 2024-10-18 PROCEDURE — 85027 COMPLETE CBC AUTOMATED: CPT

## 2024-10-18 PROCEDURE — 86431 RHEUMATOID FACTOR QUANT: CPT

## 2024-10-18 PROCEDURE — 85652 RBC SED RATE AUTOMATED: CPT

## 2024-10-18 NOTE — PROGRESS NOTES
"Subjective   Patient ID: Yuly Pond is a 48 y.o. female who presents for Follow-up.    1. Ms. Yuly Pond today came here for severe pain in left flank.  No fall, trauma, injury.  On and off it comes.  She is very concerned with that.  Feeling very weak, tired, fatigued, exhausted.  She came for follow-up.  2. Follow-up on mammogram.    I have personally reviewed the patient's Past Medical History, Medications, Allergies, Social History, and Family History in the EMR.    Review of Systems   All other systems reviewed and are negative.    Objective   /88   Ht 1.6 m (5' 3\")   Wt 103 kg (227 lb)   BMI 40.21 kg/m²     Physical Exam  Vitals reviewed.   Cardiovascular:      Heart sounds: Normal heart sounds, S1 normal and S2 normal. No murmur heard.     No friction rub.   Pulmonary:      Effort: Pulmonary effort is normal.      Breath sounds: Normal breath sounds and air entry.   Abdominal:      Palpations: There is no hepatomegaly, splenomegaly or mass.      Comments: Left flank swelling and tenderness.  Movements painful.   Musculoskeletal:      Right lower leg: No edema.      Left lower leg: No edema.   Lymphadenopathy:      Lower Body: No right inguinal adenopathy. No left inguinal adenopathy.   Neurological:      Cranial Nerves: Cranial nerves 2-12 are intact.      Sensory: No sensory deficit.      Motor: Motor function is intact.      Deep Tendon Reflexes: Reflexes are normal and symmetric.     LAB WORK: Laboratory testing discussed.    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 Left flank pain.  CT scan showed infarct.  I am surprised. I will check her for lupus.  I ordered CT angio  ______ splenic artery block ______ recommended.  2. Mammogram abnormal.  I ordered ultrasound and referred to Dr. Caitlyn Simon for evaluation.  3. Splenic infarct.  I also referred her Hematology. She might need vascular surgeon.  4. Complete blood work done.  5. I shall see her back in a week after tests.    Scribe Attestation  By signing my name below, I, Vivienne Leone attest that this documentation has been prepared under the direction and in the presence of Mandeep Purvis MD.

## 2024-10-21 ENCOUNTER — HOSPITAL ENCOUNTER (OUTPATIENT)
Dept: RADIOLOGY | Facility: CLINIC | Age: 49
Discharge: HOME | End: 2024-10-21
Payer: COMMERCIAL

## 2024-10-21 DIAGNOSIS — R92.8 OTHER ABNORMAL AND INCONCLUSIVE FINDINGS ON DIAGNOSTIC IMAGING OF BREAST: ICD-10-CM

## 2024-10-21 LAB — ANA SER QL HEP2 SUBST: NEGATIVE

## 2024-10-21 PROCEDURE — 76642 ULTRASOUND BREAST LIMITED: CPT | Mod: LEFT SIDE | Performed by: STUDENT IN AN ORGANIZED HEALTH CARE EDUCATION/TRAINING PROGRAM

## 2024-10-21 PROCEDURE — 76981 USE PARENCHYMA: CPT | Mod: LT

## 2024-10-21 PROCEDURE — 77065 DX MAMMO INCL CAD UNI: CPT | Mod: LEFT SIDE | Performed by: STUDENT IN AN ORGANIZED HEALTH CARE EDUCATION/TRAINING PROGRAM

## 2024-10-21 PROCEDURE — 77061 BREAST TOMOSYNTHESIS UNI: CPT | Mod: LT

## 2024-10-21 PROCEDURE — 76642 ULTRASOUND BREAST LIMITED: CPT | Mod: LT

## 2024-10-21 PROCEDURE — 77061 BREAST TOMOSYNTHESIS UNI: CPT | Mod: LEFT SIDE | Performed by: STUDENT IN AN ORGANIZED HEALTH CARE EDUCATION/TRAINING PROGRAM

## 2024-10-22 ENCOUNTER — OFFICE VISIT (OUTPATIENT)
Dept: PRIMARY CARE | Facility: CLINIC | Age: 49
End: 2024-10-22
Payer: COMMERCIAL

## 2024-10-22 VITALS
HEIGHT: 63 IN | SYSTOLIC BLOOD PRESSURE: 144 MMHG | DIASTOLIC BLOOD PRESSURE: 80 MMHG | WEIGHT: 228 LBS | BODY MASS INDEX: 40.4 KG/M2

## 2024-10-22 DIAGNOSIS — E78.00 HIGH CHOLESTEROL: ICD-10-CM

## 2024-10-22 DIAGNOSIS — E11.9 TYPE 2 DIABETES MELLITUS WITHOUT COMPLICATION, UNSPECIFIED WHETHER LONG TERM INSULIN USE (MULTI): ICD-10-CM

## 2024-10-22 DIAGNOSIS — G43.909 MIGRAINE WITHOUT STATUS MIGRAINOSUS, NOT INTRACTABLE, UNSPECIFIED MIGRAINE TYPE: ICD-10-CM

## 2024-10-22 DIAGNOSIS — I10 BENIGN ESSENTIAL HYPERTENSION: ICD-10-CM

## 2024-10-22 DIAGNOSIS — R92.8 ABNORMAL MRI, BREAST: Primary | ICD-10-CM

## 2024-10-22 DIAGNOSIS — G47.00 INSOMNIA, UNSPECIFIED TYPE: ICD-10-CM

## 2024-10-22 DIAGNOSIS — D73.5 SPLENIC INFARCT: ICD-10-CM

## 2024-10-22 DIAGNOSIS — F41.9 ANXIETY: ICD-10-CM

## 2024-10-22 PROCEDURE — 99214 OFFICE O/P EST MOD 30 MIN: CPT | Performed by: INTERNAL MEDICINE

## 2024-10-22 PROCEDURE — 3008F BODY MASS INDEX DOCD: CPT | Performed by: INTERNAL MEDICINE

## 2024-10-22 PROCEDURE — 3079F DIAST BP 80-89 MM HG: CPT | Performed by: INTERNAL MEDICINE

## 2024-10-22 PROCEDURE — 3046F HEMOGLOBIN A1C LEVEL >9.0%: CPT | Performed by: INTERNAL MEDICINE

## 2024-10-22 PROCEDURE — 3077F SYST BP >= 140 MM HG: CPT | Performed by: INTERNAL MEDICINE

## 2024-10-22 PROCEDURE — 4010F ACE/ARB THERAPY RXD/TAKEN: CPT | Performed by: INTERNAL MEDICINE

## 2024-10-23 NOTE — PROGRESS NOTES
"Subjective   Patient ID: Yuly Pond is a 48 y.o. female who presents for Follow-up.    Ms. Yuly Pond today came here for multiple medical issues.  1. There is a question of splenic infarct.  She is painful.  It hurts in that area.  She is concerned with that. She wants spleen out.  I am not sure whether we really need that extreme measure.  2. Follow-up on other conditions.  3. She told me she is tried of going to specialist and doctor, neither she has time and money for that.  She came for follow-up on various conditions.    I have personally reviewed the patient's Past Medical History, Medications, Allergies, Social History, and Family History in the EMR.    Review of Systems   All other systems reviewed and are negative.    Objective   /80   Ht 1.6 m (5' 3\")   Wt 103 kg (228 lb)   BMI 40.39 kg/m²     Physical Exam  Vitals reviewed.   Cardiovascular:      Heart sounds: Normal heart sounds, S1 normal and S2 normal. No murmur heard.     No friction rub.   Pulmonary:      Effort: Pulmonary effort is normal.      Breath sounds: Normal breath sounds and air entry.   Abdominal:      Palpations: There is no hepatomegaly, splenomegaly or mass.   Musculoskeletal:      Right lower leg: No edema.      Left lower leg: No edema.   Lymphadenopathy:      Lower Body: No right inguinal adenopathy. No left inguinal adenopathy.   Neurological:      Cranial Nerves: Cranial nerves 2-12 are intact.      Sensory: No sensory deficit.      Motor: Motor function is intact.      Deep Tendon Reflexes: Reflexes are normal and symmetric.     LAB WORK: Laboratory testing discussed.    Assessment/Plan   Problem List Items Addressed This Visit             ICD-10-CM       Cardiac and Vasculature    Benign essential hypertension I10       Endocrine/Metabolic    Diabetes mellitus (Multi) E11.9       Mental Health    Anxiety F41.9       Neuro    Migraine G43.909     Other Visit Diagnoses         Codes    Abnormal MRI, breast    -  " Primary R92.8    Splenic infarct     D73.5    Relevant Orders    Referral to Hematology and Oncology    High cholesterol     E78.00    Insomnia, unspecified type     G47.00        1. Left splenic infarct.  The patient used to be on blood thinner before.  She is not IV drug abuse.  MARTIN negative.  I discussed about possibly taking baby aspirin.  There is not clot.  I think probably she needs anticoagulation.  I am going to talk to blood specialist to see whether we need to put her on lifetime anticoagulation.  I am not sure.  I am going to talk to Hematology/Oncology.  2. Abnormal MRI.  No breast lump.  Reassured her.  3. Type 2 diabetes.  Things are improving.  Increase long-acting insulin.  4. Hypertension, okay.  5. High cholesterol, stable.  6. Anxiety, stable, okay.  7. Follow-up with me in three to four week’s time and I urged her to continue to monitor sugar better.  8. Insomnia.  Trazodone working.  9. Migraine, okay.  10. I will continue to follow.    Scribe Attestation  By signing my name below, I, Vivienne Leone attest that this documentation has been prepared under the direction and in the presence of Mandeep Purvis MD.

## 2024-11-27 DIAGNOSIS — E08.00 DIABETES MELLITUS DUE TO UNDERLYING CONDITION WITH HYPEROSMOLARITY WITHOUT COMA, WITHOUT LONG-TERM CURRENT USE OF INSULIN: ICD-10-CM

## 2024-11-27 RX ORDER — INSULIN GLARGINE 100 [IU]/ML
80 INJECTION, SOLUTION SUBCUTANEOUS DAILY
Qty: 30 ML | Refills: 0 | OUTPATIENT
Start: 2024-11-27 | End: 2024-12-27

## 2024-12-05 DIAGNOSIS — E08.00 DIABETES MELLITUS DUE TO UNDERLYING CONDITION WITH HYPEROSMOLARITY WITHOUT COMA, WITHOUT LONG-TERM CURRENT USE OF INSULIN: ICD-10-CM

## 2024-12-05 RX ORDER — INSULIN GLARGINE 100 [IU]/ML
80 INJECTION, SOLUTION SUBCUTANEOUS DAILY
Qty: 30 ML | Refills: 2 | Status: CANCELLED | OUTPATIENT
Start: 2024-12-05 | End: 2025-03-28

## 2024-12-06 ENCOUNTER — OFFICE VISIT (OUTPATIENT)
Dept: PRIMARY CARE | Facility: CLINIC | Age: 49
End: 2024-12-06
Payer: COMMERCIAL

## 2024-12-06 VITALS
TEMPERATURE: 96.6 F | OXYGEN SATURATION: 96 % | SYSTOLIC BLOOD PRESSURE: 140 MMHG | HEART RATE: 90 BPM | WEIGHT: 227 LBS | BODY MASS INDEX: 33.62 KG/M2 | HEIGHT: 69 IN | DIASTOLIC BLOOD PRESSURE: 74 MMHG

## 2024-12-06 DIAGNOSIS — Z12.11 SCREENING FOR COLON CANCER: ICD-10-CM

## 2024-12-06 DIAGNOSIS — E08.00 DIABETES MELLITUS DUE TO UNDERLYING CONDITION WITH HYPEROSMOLARITY WITHOUT COMA, WITHOUT LONG-TERM CURRENT USE OF INSULIN: ICD-10-CM

## 2024-12-06 DIAGNOSIS — I10 BENIGN ESSENTIAL HYPERTENSION: ICD-10-CM

## 2024-12-06 DIAGNOSIS — R73.09 BLOOD GLUCOSE ABNORMAL: ICD-10-CM

## 2024-12-06 DIAGNOSIS — D73.5 SPLENIC INFARCT: Primary | ICD-10-CM

## 2024-12-06 PROCEDURE — 3046F HEMOGLOBIN A1C LEVEL >9.0%: CPT | Performed by: INTERNAL MEDICINE

## 2024-12-06 PROCEDURE — 3078F DIAST BP <80 MM HG: CPT | Performed by: INTERNAL MEDICINE

## 2024-12-06 PROCEDURE — 3008F BODY MASS INDEX DOCD: CPT | Performed by: INTERNAL MEDICINE

## 2024-12-06 PROCEDURE — 3077F SYST BP >= 140 MM HG: CPT | Performed by: INTERNAL MEDICINE

## 2024-12-06 PROCEDURE — 99204 OFFICE O/P NEW MOD 45 MIN: CPT | Performed by: INTERNAL MEDICINE

## 2024-12-06 RX ORDER — INSULIN GLARGINE 100 [IU]/ML
80 INJECTION, SOLUTION SUBCUTANEOUS DAILY
Qty: 30 ML | Refills: 2 | Status: SHIPPED | OUTPATIENT
Start: 2024-12-06 | End: 2025-03-29

## 2024-12-06 ASSESSMENT — PATIENT HEALTH QUESTIONNAIRE - PHQ9
2. FEELING DOWN, DEPRESSED OR HOPELESS: NOT AT ALL
1. LITTLE INTEREST OR PLEASURE IN DOING THINGS: NOT AT ALL
SUM OF ALL RESPONSES TO PHQ9 QUESTIONS 1 AND 2: 0

## 2024-12-06 ASSESSMENT — COLUMBIA-SUICIDE SEVERITY RATING SCALE - C-SSRS
6. HAVE YOU EVER DONE ANYTHING, STARTED TO DO ANYTHING, OR PREPARED TO DO ANYTHING TO END YOUR LIFE?: NO
2. HAVE YOU ACTUALLY HAD ANY THOUGHTS OF KILLING YOURSELF?: NO
1. IN THE PAST MONTH, HAVE YOU WISHED YOU WERE DEAD OR WISHED YOU COULD GO TO SLEEP AND NOT WAKE UP?: NO

## 2024-12-06 ASSESSMENT — ENCOUNTER SYMPTOMS
OCCASIONAL FEELINGS OF UNSTEADINESS: 0
DEPRESSION: 0
LOSS OF SENSATION IN FEET: 0

## 2024-12-06 ASSESSMENT — PAIN SCALES - GENERAL: PAINLEVEL_OUTOF10: 5

## 2024-12-06 NOTE — PROGRESS NOTES
"Subjective   Patient ID: Yuly Pond is a 49 y.o. female who presents for Establish Care.    HPI     She has history of diabetes, diabetic neuropathy, dyslipidemia, migraine headaches    H/O diabetes mellitus-stated compliant with ADA diet.  Denied any hypoglycemic episodes    Neuropathy in feet-chronic pain.  Stated she tried many medications with no relief    Loss of appetite since June 2024, stated she lost weight initially, gained all her weight back in last few months.  She had CT abdomen and pelvis, which showed splenic infarct.  Dr. Purvis referred her to hematology which she did not make an appointment yet          Review of Systems   Constitutional:  Negative for chills and fatigue.   HENT:  Negative for postnasal drip, sinus pressure and trouble swallowing.    Respiratory:  Negative for cough, shortness of breath and wheezing.    Cardiovascular:  Negative for chest pain, palpitations and leg swelling.   Gastrointestinal:  Negative for abdominal pain, blood in stool, nausea and vomiting.   Genitourinary:  Negative for dysuria and frequency.   Musculoskeletal:  Positive for arthralgias. Negative for myalgias.   Skin:  Negative for rash.   Neurological:  Positive for numbness. Negative for tremors and seizures.   Psychiatric/Behavioral:  Negative for behavioral problems.        Objective   /74 (BP Location: Left arm, Patient Position: Sitting, BP Cuff Size: Large adult)   Pulse 90   Temp 35.9 °C (96.6 °F) (Temporal)   Ht 1.753 m (5' 9\")   Wt 103 kg (227 lb)   SpO2 96%   BMI 33.52 kg/m²     Physical Exam  Constitutional:       General: She is not in acute distress.     Appearance: Normal appearance.   HENT:      Head: Normocephalic and atraumatic.   Eyes:      Extraocular Movements: Extraocular movements intact.      Conjunctiva/sclera: Conjunctivae normal.   Cardiovascular:      Rate and Rhythm: Normal rate and regular rhythm.      Heart sounds: Normal heart sounds. No murmur heard.  Pulmonary:    "   Effort: Pulmonary effort is normal.      Breath sounds: Normal breath sounds. No wheezing or rales.   Abdominal:      General: Bowel sounds are normal.      Palpations: Abdomen is soft.      Tenderness: There is no guarding.      Comments: Mild left upper quadrant tenderness on palpation   Musculoskeletal:         General: Normal range of motion.      Right lower leg: No edema.      Left lower leg: No edema.   Neurological:      General: No focal deficit present.      Mental Status: She is alert and oriented to person, place, and time.      Cranial Nerves: No cranial nerve deficit.      Gait: Gait normal.   Psychiatric:         Mood and Affect: Mood normal.         Assessment/Plan         Yuly was seen today for establish care.  Diagnoses and all orders for this visit:  Splenic infarct (Primary)  -     Referral to Hematology and Oncology; Future  Diabetes mellitus due to underlying condition with hyperosmolarity without coma, without long-term current use of insulin  -     insulin glargine (Basaglar KwikPen U-100 Insulin) 100 unit/mL (3 mL) pen; Inject 80 Units under the skin once daily. Take as directed per insulin instructions.  Blood glucose abnormal  Screening for colon cancer  -     Referral to Gastroenterology; Future    Advised compliance with low-carb diet  Stated Trulicity dose was increased few months ago, denied any hypoglycemic episodes  If no improvement in hemoglobin A1c, will consider increasing Trulicity during next visit       Lab Results   Component Value Date    WBC 5.4 10/18/2024    HGB 11.2 (L) 10/18/2024    HCT 33.6 (L) 10/18/2024    MCV 86 10/18/2024     10/18/2024     Lab Results   Component Value Date    GLUCOSE 263 (H) 10/18/2024    CALCIUM 9.6 10/18/2024     (L) 10/18/2024    K 4.2 10/18/2024    CO2 27 10/18/2024     10/18/2024    BUN 23 10/18/2024    CREATININE 1.17 (H) 10/18/2024     Lab Results   Component Value Date    HGBA1C 9.5 (H) 10/18/2024       === 10/18/24  ===    CT ABDOMEN PELVIS ANGIOGRAM W AND/OR WO IV CONTRAST    - Impression -  Similar to prior study, there is a wedge-shaped hypodensity within  the posterior aspect of the splenic parenchyma suggestive of splenic  infarct without definite evidence of causation. Specifically, splenic  artery is widely patent without evidence of thrombosis or dissection.      Signed by: Holly Branch 10/18/2024 3:48 PM  Dictation workstation:   [id number]      Current Outpatient Medications   Medication Instructions    bacitracin 500 unit/gram ointment Apply topically.    Basaglar KwikPen U-100 Insulin 80 Units, subcutaneous, Daily, Take as directed per insulin instructions.    blood sugar diagnostic (OneTouch Verio test strips) strip TEST 2 TIMES DAILY    econazole nitrate 1 % cream 2 times daily    fluticasone furoate-vilanteroL (Breo Ellipta) 200-25 mcg/dose inhaler Daily RT    ibuprofen 600 mg, oral, 3 times daily    insulin lispro (HUMALOG KWIKPEN INSULIN) 25 Units, subcutaneous, 2 times daily    lidocaine (Lidoderm) 5 % patch 1 patch, transdermal, Daily, Apply to painful area 12 hours per day, remove for 12 hours.    mirtazapine (REMERON) 15 mg, oral, Nightly    nystatin (Mycostatin) ointment     ondansetron ODT (Zofran-ODT) 8 mg disintegrating tablet DISSOLVE ONE TABLET BY MOUTH EVERY 8 HOURS AS NEEDED    potassium chloride CR (Klor-Con M20) 20 mEq ER tablet 1 tablet, Daily    triamcinolone (Kenalog) 0.1 % cream Apply topically. Apply to affected areas 2-3 times daily    Trulicity 1.5 mg, subcutaneous, Once Weekly

## 2024-12-09 ASSESSMENT — ENCOUNTER SYMPTOMS
NUMBNESS: 1
WHEEZING: 0
SHORTNESS OF BREATH: 0
ABDOMINAL PAIN: 0
TROUBLE SWALLOWING: 0
DYSURIA: 0
SINUS PRESSURE: 0
TREMORS: 0
PALPITATIONS: 0
SEIZURES: 0
MYALGIAS: 0
COUGH: 0
CHILLS: 0
NAUSEA: 0
FREQUENCY: 0
BLOOD IN STOOL: 0
ARTHRALGIAS: 1
FATIGUE: 0
VOMITING: 0

## 2025-02-06 DIAGNOSIS — F32.A MILD DEPRESSION: ICD-10-CM

## 2025-02-06 DIAGNOSIS — S93.419S: ICD-10-CM

## 2025-02-06 RX ORDER — MIRTAZAPINE 15 MG/1
15 TABLET, FILM COATED ORAL NIGHTLY
Qty: 30 TABLET | Refills: 0 | Status: SHIPPED | OUTPATIENT
Start: 2025-02-06

## 2025-02-06 RX ORDER — IBUPROFEN 600 MG/1
600 TABLET ORAL 3 TIMES DAILY
Qty: 90 TABLET | Refills: 0 | Status: SHIPPED | OUTPATIENT
Start: 2025-02-06

## 2025-02-13 ENCOUNTER — OFFICE VISIT (OUTPATIENT)
Dept: GASTROENTEROLOGY | Facility: CLINIC | Age: 50
End: 2025-02-13
Payer: COMMERCIAL

## 2025-02-13 VITALS
DIASTOLIC BLOOD PRESSURE: 77 MMHG | HEART RATE: 91 BPM | WEIGHT: 229 LBS | SYSTOLIC BLOOD PRESSURE: 148 MMHG | HEIGHT: 69 IN | BODY MASS INDEX: 33.92 KG/M2 | TEMPERATURE: 97.8 F

## 2025-02-13 DIAGNOSIS — R68.81 EARLY SATIETY: ICD-10-CM

## 2025-02-13 DIAGNOSIS — R10.12 LEFT UPPER QUADRANT ABDOMINAL PAIN: Primary | ICD-10-CM

## 2025-02-13 DIAGNOSIS — D73.5 SPLENIC INFARCT: ICD-10-CM

## 2025-02-13 DIAGNOSIS — K59.00 CONSTIPATION, UNSPECIFIED CONSTIPATION TYPE: ICD-10-CM

## 2025-02-13 DIAGNOSIS — Z12.11 SCREENING FOR COLON CANCER: ICD-10-CM

## 2025-02-13 PROCEDURE — 1036F TOBACCO NON-USER: CPT | Performed by: NURSE PRACTITIONER

## 2025-02-13 PROCEDURE — 3008F BODY MASS INDEX DOCD: CPT | Performed by: NURSE PRACTITIONER

## 2025-02-13 PROCEDURE — 99214 OFFICE O/P EST MOD 30 MIN: CPT | Performed by: NURSE PRACTITIONER

## 2025-02-13 PROCEDURE — 99204 OFFICE O/P NEW MOD 45 MIN: CPT | Performed by: NURSE PRACTITIONER

## 2025-02-13 PROCEDURE — 3078F DIAST BP <80 MM HG: CPT | Performed by: NURSE PRACTITIONER

## 2025-02-13 PROCEDURE — 3077F SYST BP >= 140 MM HG: CPT | Performed by: NURSE PRACTITIONER

## 2025-02-13 RX ORDER — SODIUM, POTASSIUM,MAG SULFATES 17.5-3.13G
SOLUTION, RECONSTITUTED, ORAL ORAL
Qty: 354 ML | Refills: 0 | Status: SHIPPED | OUTPATIENT
Start: 2025-02-13

## 2025-02-13 RX ORDER — POLYETHYLENE GLYCOL 3350 17 G/17G
17 POWDER, FOR SOLUTION ORAL DAILY
Qty: 510 G | Refills: 2 | Status: SHIPPED | OUTPATIENT
Start: 2025-02-13 | End: 2026-02-13

## 2025-02-13 RX ORDER — DICYCLOMINE HYDROCHLORIDE 10 MG/1
10 CAPSULE ORAL
Qty: 120 CAPSULE | Refills: 11 | Status: SHIPPED | OUTPATIENT
Start: 2025-02-13 | End: 2026-02-13

## 2025-02-13 ASSESSMENT — ENCOUNTER SYMPTOMS
HEMATOLOGIC/LYMPHATIC NEGATIVE: 1
ALLERGIC/IMMUNOLOGIC NEGATIVE: 1
RESPIRATORY NEGATIVE: 1
PSYCHIATRIC NEGATIVE: 1
CARDIOVASCULAR NEGATIVE: 1
CONSTITUTIONAL NEGATIVE: 1
MUSCULOSKELETAL NEGATIVE: 1
ENDOCRINE NEGATIVE: 1
GASTROINTESTINAL NEGATIVE: 1
NEUROLOGICAL NEGATIVE: 1
EYES NEGATIVE: 1

## 2025-02-13 ASSESSMENT — PAIN SCALES - GENERAL: PAINLEVEL_OUTOF10: 5

## 2025-02-13 NOTE — PROGRESS NOTES
Subjective   Patient ID: Yuly Pond is a 49 y.o. female who presents for No chief complaint on file..  HPI  49-year-old female for new patient visit for evaluation of spleen infarct  Medical history includes diabetes, followed by hematology, hypertension, anxiety, migraine, cholelithiasis, DVT in her leg- was on blood thinner and off 3 yrs  FHX breast cancer- mom, maternal grandmother- ? Brain cancer, both have had blood clots  Labs reviewed 10/18/2024  Negative MARTIN  Hemoglobin A1c 9.5%  Normal LFTs except alk phos which is 143  H&H 11.2 and 33.6  Amylase 23  Lipase 33  10/11/2024 CT of the abdomen pelvis with IV contrast shows a normal-sized liver, gallbladder surgically absent, pancreas unremarkable, there is a wedge-shaped hypodensity within the posterior aspect of the splenic parenchyma suggestive of splenic infarct  10/18/2024 CT angio of the abdomen pelvis with and without contrast shows similar to prior study there is a wedge-shaped hypodensity within the posterior aspect of the splenic parenchyma suggestive of splenic infarct without definite evidence of causation specifically splenic artery is widely patent without evidence of thrombus or dissection    Over the summer was in [place] for work  Had extreme pain and diarrhea for 2 months, was missing working- no testing done  Was eating but lacked appetite  Early satiety  Constant pain on left side  BM pebbly stools  No blood or melena  Foul smelling and gas    Review of Systems   Constitutional: Negative.    HENT: Negative.     Eyes: Negative.    Respiratory: Negative.     Cardiovascular: Negative.    Gastrointestinal: Negative.    Endocrine: Negative.    Genitourinary: Negative.    Musculoskeletal: Negative.    Skin: Negative.    Allergic/Immunologic: Negative.    Neurological: Negative.    Hematological: Negative.    Psychiatric/Behavioral: Negative.         Objective   Physical Exam  Constitutional:       Appearance: Normal appearance.   HENT:      Head:  Normocephalic.      Nose: Nose normal.      Mouth/Throat:      Mouth: Mucous membranes are moist.   Eyes:      Pupils: Pupils are equal, round, and reactive to light.   Cardiovascular:      Rate and Rhythm: Normal rate and regular rhythm.      Pulses: Normal pulses.      Heart sounds: Normal heart sounds.   Pulmonary:      Effort: Pulmonary effort is normal.      Breath sounds: Normal breath sounds.   Abdominal:      General: Bowel sounds are normal.      Palpations: Abdomen is soft.   Musculoskeletal:         General: Normal range of motion.      Cervical back: Normal range of motion and neck supple.   Skin:     General: Skin is warm and dry.   Neurological:      Mental Status: She is alert.   Psychiatric:         Mood and Affect: Mood normal.         Assessment/Plan        Early satiety- I will order an EGD to assess the upper Gi tract for causes.     LUQ pain and change in bowel habits- some of your pain and gas may be due to constipation. I would recommend using miralax one capful daily to help you have a more complete bowel movement, if you have having too much stool you can decrease it if needed.  I will order a colonoscopy to assess your lower GI tract as well. I will also order dicyclomine that you can use up to 4 times daily for abdominal pain, take one before each meal and at bedtime.     Splenic infarct- I do not see a causative factor for this, I will refer you to discuss with a surgeon and I would also recommend seeing hematology due to the history of blood clots.    I will see you back for follow up after your testing    SHANELLE Seymour-CNP 02/13/25 2:56 PM

## 2025-02-13 NOTE — PATIENT INSTRUCTIONS
Early satiety- I will order an EGD to assess the upper Gi tract for causes.     LUQ pain and change in bowel habits- some of your pain and gas may be due to constipation. I would recommend using miralax one capful daily to help you have a more complete bowel movement, if you have having too much stool you can decrease it if needed.  I will order a colonoscopy to assess your lower GI tract as well. I will also order dicyclomine that you can use up to 4 times daily for abdominal pain, take one before each meal and at bedtime.     Splenic infarct- I do not see a causative factor for this, I will refer you to discuss with a surgeon and I would also recommend seeing hematology due to the history of blood clots.    I will see you back for follow up after your testing

## 2025-03-14 DIAGNOSIS — R68.81 EARLY SATIETY: Primary | ICD-10-CM

## 2025-03-14 RX ORDER — OMEPRAZOLE 40 MG/1
40 CAPSULE, DELAYED RELEASE ORAL DAILY
Qty: 30 CAPSULE | Refills: 3 | Status: SHIPPED | OUTPATIENT
Start: 2025-03-14 | End: 2026-03-14

## 2025-03-19 ENCOUNTER — APPOINTMENT (OUTPATIENT)
Dept: GASTROENTEROLOGY | Facility: EXTERNAL LOCATION | Age: 50
End: 2025-03-19
Payer: COMMERCIAL

## 2025-05-01 ENCOUNTER — OFFICE VISIT (OUTPATIENT)
Dept: HEMATOLOGY/ONCOLOGY | Facility: CLINIC | Age: 50
End: 2025-05-01
Payer: COMMERCIAL

## 2025-05-01 VITALS
TEMPERATURE: 97.5 F | RESPIRATION RATE: 18 BRPM | OXYGEN SATURATION: 96 % | WEIGHT: 225.42 LBS | SYSTOLIC BLOOD PRESSURE: 136 MMHG | HEIGHT: 68 IN | DIASTOLIC BLOOD PRESSURE: 81 MMHG | HEART RATE: 87 BPM | BODY MASS INDEX: 34.16 KG/M2

## 2025-05-01 DIAGNOSIS — D64.9 ANEMIA, UNSPECIFIED TYPE: Primary | ICD-10-CM

## 2025-05-01 DIAGNOSIS — D73.5 SPLENIC INFARCT: ICD-10-CM

## 2025-05-01 LAB
ALBUMIN SERPL BCP-MCNC: 4 G/DL (ref 3.4–5)
ALP SERPL-CCNC: 136 U/L (ref 33–110)
ALT SERPL W P-5'-P-CCNC: 25 U/L (ref 7–45)
ANION GAP SERPL CALC-SCNC: 12 MMOL/L (ref 10–20)
AST SERPL W P-5'-P-CCNC: 15 U/L (ref 9–39)
B2 GLYCOPROT1 IGA SER-ACNC: <0.6 U/ML
B2 GLYCOPROT1 IGG SER-ACNC: <1.4 U/ML
B2 GLYCOPROT1 IGM SER-ACNC: 0.2 U/ML
BASOPHILS # BLD AUTO: 0.02 X10*3/UL (ref 0–0.1)
BASOPHILS NFR BLD AUTO: 0.4 %
BILIRUB SERPL-MCNC: 0.3 MG/DL (ref 0–1.2)
BUN SERPL-MCNC: 26 MG/DL (ref 6–23)
CALCIUM SERPL-MCNC: 9.3 MG/DL (ref 8.6–10.3)
CARDIOLIPIN IGA SERPL-ACNC: 0.5 APL U/ML
CARDIOLIPIN IGG SER IA-ACNC: <1.6 GPL U/ML
CARDIOLIPIN IGM SER IA-ACNC: 0.3 MPL U/ML
CHLORIDE SERPL-SCNC: 103 MMOL/L (ref 98–107)
CO2 SERPL-SCNC: 26 MMOL/L (ref 21–32)
CREAT SERPL-MCNC: 0.97 MG/DL (ref 0.5–1.05)
D DIMER PPP FEU-MCNC: 366 NG/ML FEU
EGFRCR SERPLBLD CKD-EPI 2021: 71 ML/MIN/1.73M*2
EOSINOPHIL # BLD AUTO: 0.06 X10*3/UL (ref 0–0.7)
EOSINOPHIL NFR BLD AUTO: 1.1 %
ERYTHROCYTE [DISTWIDTH] IN BLOOD BY AUTOMATED COUNT: 12.3 % (ref 11.5–14.5)
FERRITIN SERPL-MCNC: 103 NG/ML (ref 8–150)
FOLATE SERPL-MCNC: 11 NG/ML
GLUCOSE SERPL-MCNC: 333 MG/DL (ref 74–99)
HCT VFR BLD AUTO: 35.1 % (ref 36–46)
HGB BLD-MCNC: 11.8 G/DL (ref 12–16)
IMM GRANULOCYTES # BLD AUTO: 0.06 X10*3/UL (ref 0–0.7)
IMM GRANULOCYTES NFR BLD AUTO: 1.1 % (ref 0–0.9)
IRON SATN MFR SERPL: 26 % (ref 25–45)
IRON SERPL-MCNC: 91 UG/DL (ref 35–150)
LYMPHOCYTES # BLD AUTO: 2.22 X10*3/UL (ref 1.2–4.8)
LYMPHOCYTES NFR BLD AUTO: 40 %
MCH RBC QN AUTO: 28.6 PG (ref 26–34)
MCHC RBC AUTO-ENTMCNC: 33.6 G/DL (ref 32–36)
MCV RBC AUTO: 85 FL (ref 80–100)
MONOCYTES # BLD AUTO: 0.28 X10*3/UL (ref 0.1–1)
MONOCYTES NFR BLD AUTO: 5 %
NEUTROPHILS # BLD AUTO: 2.91 X10*3/UL (ref 1.2–7.7)
NEUTROPHILS NFR BLD AUTO: 52.4 %
NRBC BLD-RTO: 0 /100 WBCS (ref 0–0)
PLATELET # BLD AUTO: 167 X10*3/UL (ref 150–450)
POTASSIUM SERPL-SCNC: 4 MMOL/L (ref 3.5–5.3)
PROT SERPL-MCNC: 7.1 G/DL (ref 6.4–8.2)
RBC # BLD AUTO: 4.13 X10*6/UL (ref 4–5.2)
SODIUM SERPL-SCNC: 137 MMOL/L (ref 136–145)
TIBC SERPL-MCNC: 356 UG/DL (ref 240–445)
UIBC SERPL-MCNC: 265 UG/DL (ref 110–370)
VIT B12 SERPL-MCNC: 389 PG/ML (ref 211–911)
WBC # BLD AUTO: 5.6 X10*3/UL (ref 4.4–11.3)

## 2025-05-01 PROCEDURE — 82746 ASSAY OF FOLIC ACID SERUM: CPT | Performed by: INTERNAL MEDICINE

## 2025-05-01 PROCEDURE — 99205 OFFICE O/P NEW HI 60 MIN: CPT | Performed by: INTERNAL MEDICINE

## 2025-05-01 PROCEDURE — 3079F DIAST BP 80-89 MM HG: CPT | Performed by: INTERNAL MEDICINE

## 2025-05-01 PROCEDURE — 85025 COMPLETE CBC W/AUTO DIFF WBC: CPT | Performed by: INTERNAL MEDICINE

## 2025-05-01 PROCEDURE — 82607 VITAMIN B-12: CPT | Performed by: INTERNAL MEDICINE

## 2025-05-01 PROCEDURE — 83540 ASSAY OF IRON: CPT | Performed by: INTERNAL MEDICINE

## 2025-05-01 PROCEDURE — 82728 ASSAY OF FERRITIN: CPT | Performed by: INTERNAL MEDICINE

## 2025-05-01 PROCEDURE — 3008F BODY MASS INDEX DOCD: CPT | Performed by: INTERNAL MEDICINE

## 2025-05-01 PROCEDURE — 80053 COMPREHEN METABOLIC PANEL: CPT | Performed by: INTERNAL MEDICINE

## 2025-05-01 PROCEDURE — 3075F SYST BP GE 130 - 139MM HG: CPT | Performed by: INTERNAL MEDICINE

## 2025-05-01 PROCEDURE — 85301 ANTITHROMBIN III ANTIGEN: CPT | Performed by: INTERNAL MEDICINE

## 2025-05-01 PROCEDURE — 86146 BETA-2 GLYCOPROTEIN ANTIBODY: CPT | Performed by: INTERNAL MEDICINE

## 2025-05-01 PROCEDURE — 99215 OFFICE O/P EST HI 40 MIN: CPT | Performed by: INTERNAL MEDICINE

## 2025-05-01 PROCEDURE — 85379 FIBRIN DEGRADATION QUANT: CPT | Performed by: INTERNAL MEDICINE

## 2025-05-01 PROCEDURE — 86147 CARDIOLIPIN ANTIBODY EA IG: CPT | Performed by: INTERNAL MEDICINE

## 2025-05-01 RX ORDER — ACETAMINOPHEN AND CODEINE PHOSPHATE 300; 30 MG/1; MG/1
1 TABLET ORAL 2 TIMES DAILY
Qty: 60 TABLET | Refills: 0 | Status: SHIPPED | OUTPATIENT
Start: 2025-05-01 | End: 2025-05-31

## 2025-05-01 ASSESSMENT — PAIN SCALES - GENERAL: PAINLEVEL_OUTOF10: 6

## 2025-05-01 NOTE — PROGRESS NOTES
Patient ID: Yuly Pond is a 49 y.o. female.  Referring Physician: Kia Juarez, APRN-CNP  [address]  Department of Medicine-[place] [address]  Primary Care Provider: Marcie Cee MD  Referral Reason: splenic infarct    Subjective:  No complaints    Heme/Onc History:  49-year-old female for new patient visit for evaluation of spleen infarct  Medical history includes diabetes, followed by hematology, hypertension, anxiety, migraine, cholelithiasis, DVT in her leg- was on blood thinner and off 3 yrs  FHX breast cancer- mom, maternal grandmother- ? Brain cancer, both have had blood clots  Labs reviewed 10/18/2024  Negative MARTIN  Hemoglobin A1c 9.5%  Normal LFTs except alk phos which is 143  H&H 11.2 and 33.6  Amylase 23  Lipase 33  10/11/2024 CT of the abdomen pelvis with IV contrast shows a normal-sized liver, gallbladder surgically absent, pancreas unremarkable, there is a wedge-shaped hypodensity within the posterior aspect of the splenic parenchyma suggestive of splenic infarct  10/18/2024 CT angio of the abdomen pelvis with and without contrast shows similar to prior study there is a wedge-shaped hypodensity within the posterior aspect of the splenic parenchyma suggestive of splenic infarct without definite evidence of causation specifically splenic artery is widely patent without evidence of thrombus or dissection        Past Medical History: Medical History[1]  Social History:   Social History     Socioeconomic History    Marital status: Single     Spouse name: Not on file    Number of children: Not on file    Years of education: Not on file    Highest education level: Not on file   Occupational History    Not on file   Tobacco Use    Smoking status: Never    Smokeless tobacco: Never   Substance and Sexual Activity    Alcohol use: Yes     Comment: social    Drug use: Never    Sexual activity: Not on file   Other Topics Concern    Not on file   Social History Narrative    Not on  "file     Social Drivers of Health     Financial Resource Strain: Not on file   Food Insecurity: Not on file   Transportation Needs: Not on file   Physical Activity: Not on file   Stress: Not on file   Social Connections: Not on file   Intimate Partner Violence: Not on file   Housing Stability: Not on file     Surgical History: Surgical History[2]  Family History: Family History[3]   reports that she has never smoked. She has never used smokeless tobacco.  Oncology Family history: Cancer-related family history includes Breast cancer (age of onset: 65) in her mother. She was adopted.    Review Of Systems:  As stated per in HPI; otherwise all other 12 point ROS are negative    Physical Exam:  /81 (BP Location: Right arm, Patient Position: Sitting, BP Cuff Size: Adult long)   Pulse 87   Temp 36.4 °C (97.5 °F) (Temporal)   Resp 18   Ht (S) 1.739 m (5' 8.47\")   Wt 102 kg (225 lb 6.7 oz)   SpO2 96%   BMI 33.81 kg/m²   BSA: 2.22 meters squared  General: awake/alert/oriented x3, no distress, alert and cooperative  Head: Short hair fully covering scalp. Symmetric facial expressions  Eyes: PERRL, EOMI, clear sclera, eyebrows present.  Ears/Nose/Mouth/Throat:  Oral mucous membranes moist. No oral ulcers. No palpable pre/post-auricular lymph nodes  Neck: No palpable cervical chain lymph nodes  Respiratory: unlabored breathing on room air, good chest expansion, thorax symmetric  Cardio: Regular rate and rhythm, normal S1 and S2, radial pulses symmetric  GI: Nondistended, soft, non-tender abdomen  Musculoskeletal: Normal muscle bulk and tone, ROM intact, no joint swelling.  Rises from chair and walks unassisted.  Extremities: No ankle swelling, no arm or leg wounds  Neuro: Alert, cognition intact, speech normal. Facial expressions symmetric.  No motor deficits noted. Sensation intact to touch and hot/cold.   Able to stand from seated position unassisted and walks around the room unassisted.  Psychological: Appropriate " mood and behavior.  Skin: Warm and dry, no lesions, no rashes    Results:  Diagnostic Results   Lab Results   Component Value Date    WBC 5.4 10/18/2024    HGB 11.2 (L) 10/18/2024    HCT 33.6 (L) 10/18/2024    MCV 86 10/18/2024     10/18/2024     Lab Results   Component Value Date    CALCIUM 9.6 10/18/2024     (L) 10/18/2024    K 4.2 10/18/2024    CO2 27 10/18/2024     10/18/2024    BUN 23 10/18/2024    CREATININE 1.17 (H) 10/18/2024    ALT 22 10/18/2024    AST 14 10/18/2024     Current Medications[4]     Assessment/Plan:  ? Splenic infarct:    Incidentally detected on CT scan in 10/24. There was no thrombus in splenic artery.    She had unprovoked DVT of left leg in 2019 s/p AC for 2 years which she self discontinued.    Thrombophilia panel today and repeat CT angio abd asap as her LUQ abd pain is getting worse.    RTC in 3 weeks    Diagnoses and all orders for this visit:  Anemia, unspecified type  -     Iron and TIBC; Future  -     Reticulocytes; Future  -     Vitamin B12; Future  -     Ferritin; Future  -     Erythropoietin; Future  -     Lactate Dehydrogenase; Future  -     Comprehensive Metabolic Panel; Future  -     Folate; Future  -     CBC and Auto Differential; Future  -     Serum Protein Electrophoresis + Immunofixation; Future  -     [place]/Lambda Free Light Chain, Serum (Includes Kappa, Lambda and K/L ratio); Future  -     Beta 2 Microglobuin; Future  -     Immunoglobulins (IgG, IgA, IgM); Future  -     Myeloid Malignancies Panel - Myeloid NGS; Future  Splenic infarct  -     Referral To Hematology and Oncology       Performance Status: Symptomatic; fully ambulatory    I spent more than 60 minutes for the patient today, including face-to-face conversation, pre-visit preparation, post-visit orders, and others.   Dima Pino MD                                [1] No past medical history on file.  [2]   Past Surgical History:  Procedure Laterality Date    MR HEAD ANGIO WO IV CONTRAST   6/21/2016    MR HEAD ANGIO WO IV CONTRAST [facility]   [3]   Family History  Adopted: Yes   Problem Relation Name Age of Onset    Breast cancer Mother  65   [4]   Current Outpatient Medications:     bacitracin 500 unit/gram ointment, Apply topically., Disp: , Rfl:     blood sugar diagnostic (OneTouch Verio test strips) strip, TEST 2 TIMES DAILY, Disp: , Rfl:     dicyclomine (Bentyl) 10 mg capsule, Take 1 capsule (10 mg) by mouth 4 times a day before meals. Take with meals and at bedtime, Disp: 120 capsule, Rfl: 11    dulaglutide (Trulicity) 1.5 mg/0.5 mL pen injector injection, Inject 1.5 mg under the skin 1 (one) time per week., Disp: 2 mL, Rfl: 11    fluticasone furoate-vilanteroL (Breo Ellipta) 200-25 mcg/dose inhaler, Inhale once daily., Disp: , Rfl:     ibuprofen 600 mg tablet, Take 1 tablet (600 mg) by mouth 3 times a day., Disp: 90 tablet, Rfl: 0    insulin glargine (Basaglar KwikPen U-100 Insulin) 100 unit/mL (3 mL) pen, Inject 80 Units under the skin once daily. Take as directed per insulin instructions., Disp: 30 mL, Rfl: 2    insulin lispro (HumaLOG KwikPen Insulin) 100 unit/mL injection, Inject 25 Units under the skin 2 times a day., Disp: 15 mL, Rfl: 0    mirtazapine (Remeron) 15 mg tablet, Take 1 tablet (15 mg) by mouth once daily at bedtime., Disp: 30 tablet, Rfl: 0    nystatin (Mycostatin) ointment, , Disp: , Rfl:     omeprazole (PriLOSEC) 40 mg DR capsule, Take 1 capsule (40 mg) by mouth once daily. Do not crush or chew., Disp: 30 capsule, Rfl: 3    ondansetron ODT (Zofran-ODT) 8 mg disintegrating tablet, DISSOLVE ONE TABLET BY MOUTH EVERY 8 HOURS AS NEEDED, Disp: 90 tablet, Rfl: 0    polyethylene glycol (Miralax) 17 gram/dose powder, Mix 17 g of powder and drink once daily., Disp: 510 g, Rfl: 2    sodium,potassium,mag sulfates (Suprep Bowel Prep Kit) 17.5-3.13-1.6 gram solution, Take one bottle twice as directed by the prep instructions, Disp: 354 mL, Rfl: 0    triamcinolone (Kenalog) 0.1  % cream, Apply topically. Apply to affected areas 2-3 times daily, Disp: , Rfl:

## 2025-05-04 LAB — AT III AG ACT/NOR PPP IA: 128 % (ref 82–136)

## 2025-05-05 LAB
DRVVT SCREEN TO CONFIRM RATIO: 0.94 RATIO
DRVVT/DRVVT CFM NRMLZD PPP-RTO: 0.96 RATIO
DRVVT/DRVVT CFM P DOAC NEUT NORM PPP-RTO: 0.97 RATIO
ELECTRONICALLY SIGNED BY: NORMAL
FACTOR V LEIDEN INTERPRETATION: NORMAL
FACTOR V LEIDEN RESULT: NORMAL
LA 2 SCREEN W REFLEX-IMP: NORMAL
NORMALIZED SCT PPP-RTO: 0.98 RATIO
SILICA CLOTTING TIME CONFIRMATION: 1.04 RATIO
SILICA CLOTTING TIME SCREEN: 1.02 RATIO

## 2025-05-06 LAB
PROT C ACT/NOR PPP CHRO: >150 % ACTIVITY (ref 70–150)
PROT S ACT/NOR PPP: 99 % ACTIVITY (ref 64–150)

## 2025-05-12 LAB
ELECTRONICALLY SIGNED BY: NORMAL
FACTOR II-PROTHROMBIN INTERPRETATION: NORMAL
FACTOR II-PROTHROMBIN RESULT: NORMAL

## 2025-05-16 DIAGNOSIS — E08.00 DIABETES MELLITUS DUE TO UNDERLYING CONDITION WITH HYPEROSMOLARITY WITHOUT COMA, WITHOUT LONG-TERM CURRENT USE OF INSULIN: ICD-10-CM

## 2025-05-19 RX ORDER — INSULIN GLARGINE 100 [IU]/ML
INJECTION, SOLUTION SUBCUTANEOUS
Qty: 30 ML | Refills: 2 | Status: SHIPPED | OUTPATIENT
Start: 2025-05-19

## 2025-05-22 ENCOUNTER — APPOINTMENT (OUTPATIENT)
Dept: RADIOLOGY | Facility: HOSPITAL | Age: 50
End: 2025-05-22
Payer: COMMERCIAL

## 2025-05-22 DIAGNOSIS — D73.5 SPLENIC INFARCT: ICD-10-CM

## 2025-05-22 PROCEDURE — 74174 CTA ABD&PLVS W/CONTRAST: CPT

## 2025-05-22 PROCEDURE — 2550000001 HC RX 255 CONTRASTS: Performed by: INTERNAL MEDICINE

## 2025-05-22 RX ADMIN — IOHEXOL 120 ML: 350 INJECTION, SOLUTION INTRAVENOUS at 16:51

## 2025-06-04 ENCOUNTER — OFFICE VISIT (OUTPATIENT)
Dept: HEMATOLOGY/ONCOLOGY | Facility: CLINIC | Age: 50
End: 2025-06-04
Payer: COMMERCIAL

## 2025-06-04 VITALS
BODY MASS INDEX: 33.83 KG/M2 | RESPIRATION RATE: 18 BRPM | SYSTOLIC BLOOD PRESSURE: 162 MMHG | OXYGEN SATURATION: 97 % | WEIGHT: 225.53 LBS | DIASTOLIC BLOOD PRESSURE: 90 MMHG | HEART RATE: 89 BPM | TEMPERATURE: 97 F

## 2025-06-04 DIAGNOSIS — R16.1 SPLENOMEGALY: ICD-10-CM

## 2025-06-04 DIAGNOSIS — D73.5 SPLENIC INFARCT: ICD-10-CM

## 2025-06-04 PROCEDURE — 99215 OFFICE O/P EST HI 40 MIN: CPT | Performed by: INTERNAL MEDICINE

## 2025-06-04 PROCEDURE — 3077F SYST BP >= 140 MM HG: CPT | Performed by: INTERNAL MEDICINE

## 2025-06-04 PROCEDURE — 3080F DIAST BP >= 90 MM HG: CPT | Performed by: INTERNAL MEDICINE

## 2025-06-04 RX ORDER — ACETAMINOPHEN AND CODEINE PHOSPHATE 300; 30 MG/1; MG/1
1 TABLET ORAL EVERY 8 HOURS PRN
Qty: 90 TABLET | Refills: 0 | Status: SHIPPED | OUTPATIENT
Start: 2025-06-04 | End: 2025-07-04

## 2025-06-04 ASSESSMENT — PAIN SCALES - GENERAL: PAINLEVEL_OUTOF10: 5

## 2025-06-04 NOTE — PROGRESS NOTES
Patient ID: Yuly Pond is a 49 y.o. female.  Referring Physician: Dima Pino MD  [address]  [facility] [place] [place] [address]  Primary Care Provider: Marcie Cee MD  Referral Reason: splenic infarct    Subjective:  LUQ pain 10/10, constant    Heme/Onc History:  49-year-old female for new patient visit for evaluation of spleen infarct  Medical history includes diabetes, followed by hematology, hypertension, anxiety, migraine, cholelithiasis, DVT in her leg- was on blood thinner and off 3 yrs  FHX breast cancer- mom, maternal grandmother- ? Brain cancer, both have had blood clots  Labs reviewed 10/18/2024  Negative MARTIN  Hemoglobin A1c 9.5%  Normal LFTs except alk phos which is 143  H&H 11.2 and 33.6  Amylase 23  Lipase 33  10/11/2024 CT of the abdomen pelvis with IV contrast shows a normal-sized liver, gallbladder surgically absent, pancreas unremarkable, there is a wedge-shaped hypodensity within the posterior aspect of the splenic parenchyma suggestive of splenic infarct  10/18/2024 CT angio of the abdomen pelvis with and without contrast shows similar to prior study there is a wedge-shaped hypodensity within the posterior aspect of the splenic parenchyma suggestive of splenic infarct without definite evidence of causation specifically splenic artery is widely patent without evidence of thrombus or dissection        Past Medical History: Medical History[1]  Social History:   Social History     Socioeconomic History    Marital status: Single     Spouse name: Not on file    Number of children: Not on file    Years of education: Not on file    Highest education level: Not on file   Occupational History    Not on file   Tobacco Use    Smoking status: Never    Smokeless tobacco: Never   Substance and Sexual Activity    Alcohol use: Yes     Comment: social    Drug use: Never    Sexual activity: Not on file   Other Topics Concern    Not on file   Social History Narrative    Not on file     Social Drivers of Health      Financial Resource Strain: Not on file   Food Insecurity: Not on file   Transportation Needs: Not on file   Physical Activity: Not on file   Stress: Not on file   Social Connections: Not on file   Intimate Partner Violence: Not on file   Housing Stability: Not on file     Surgical History: Surgical History[2]  Family History: Family History[3]   reports that she has never smoked. She has never used smokeless tobacco.  Oncology Family history: Cancer-related family history includes Breast cancer (age of onset: 65) in her mother. She was adopted.    Review Of Systems:  As stated per in HPI; otherwise all other 12 point ROS are negative    Physical Exam:  /90 (BP Location: Left arm, Patient Position: Sitting, BP Cuff Size: Adult long)   Pulse 89   Temp 36.1 °C (97 °F) (Temporal)   Resp 18   Wt 102 kg (225 lb 8.5 oz)   SpO2 97%   BMI 33.83 kg/m²   BSA: 2.22 meters squared  General: awake/alert/oriented x3, no distress, alert and cooperative  Head: Short hair fully covering scalp. Symmetric facial expressions  Eyes: PERRL, EOMI, clear sclera, eyebrows present.  Ears/Nose/Mouth/Throat:  Oral mucous membranes moist. No oral ulcers. No palpable pre/post-auricular lymph nodes  Neck: No palpable cervical chain lymph nodes  Respiratory: unlabored breathing on room air, good chest expansion, thorax symmetric  Cardio: Regular rate and rhythm, normal S1 and S2, radial pulses symmetric  GI: Nondistended, soft, non-tender abdomen  Musculoskeletal: Normal muscle bulk and tone, ROM intact, no joint swelling.  Rises from chair and walks unassisted.  Extremities: No ankle swelling, no arm or leg wounds  Neuro: Alert, cognition intact, speech normal. Facial expressions symmetric.  No motor deficits noted. Sensation intact to touch and hot/cold.   Able to stand from seated position unassisted and walks around the room unassisted.  Psychological: Appropriate mood and behavior.  Skin: Warm and dry, no lesions, no  liat    Results:  Diagnostic Results   Lab Results   Component Value Date    WBC 5.6 05/01/2025    HGB 11.8 (L) 05/01/2025    HCT 35.1 (L) 05/01/2025    MCV 85 05/01/2025     05/01/2025     Lab Results   Component Value Date    CALCIUM 9.3 05/01/2025     05/01/2025    K 4.0 05/01/2025    CO2 26 05/01/2025     05/01/2025    BUN 26 (H) 05/01/2025    CREATININE 0.97 05/01/2025    ALT 25 05/01/2025    AST 15 05/01/2025     Current Medications[4]     Assessment/Plan:  ? Splenic infarct:    Incidentally detected on CT scan in 10/24. There was no thrombus in splenic artery.    She had unprovoked DVT of left leg in 2019 s/p AC for 2 years which she self discontinued.    Thrombophilia panel is negative.     The repeat CT angio abd IMPRESSION:  1. Interval decrease in size of the wedge-shaped hypodensity within  the posterior aspect of the spleen compatible with atrophy of the  infarcted tissue within this area which has been visualized on prior  exams. Again, there is no visible cause for the splenic infarction as  the splenic artery and its distal branches are widely patent.  2. Questionable new wedge-shaped hypodensity more anteriorly within  the posterior aspect of the spleen as described above which may  reflect a new age-indeterminate splenic infarct.  3. 0.3 cm nonobstructing renal calculus in the right interpolar  kidney.    I recommended her to start taking Ppx dose of Xarelto 10 mg every day lifelong due to 1 unprovoked DVT and 2 splenic infarcts, unprovoked. She is in constant pain since dx of splenic infarcts and wants splenectomy. She states that she is giving consent to all complications that may happen and is requesting referral to gen surgery    Continue Tylenol #3 for LUQ pain control    RTC in 3 months.  Diagnoses and all orders for this visit:  Splenic infarct  -     Clinic Appointment Request       Performance Status: Symptomatic; fully ambulatory    I spent more than 60 minutes for  the patient today, including face-to-face conversation, pre-visit preparation, post-visit orders, and others.   Dima Pino MD                                [1] No past medical history on file.  [2]   Past Surgical History:  Procedure Laterality Date    MR HEAD ANGIO WO IV CONTRAST  6/21/2016    MR HEAD ANGIO WO IV CONTRAST [facility]   [3]   Family History  Adopted: Yes   Problem Relation Name Age of Onset    Breast cancer Mother  65   [4]   Current Outpatient Medications:     bacitracin 500 unit/gram ointment, Apply topically., Disp: , Rfl:     Basaglar KwikPen U-100 Insulin 100 unit/mL (3 mL) pen, INJECT 80 UNITS ONCE DAILY AS INSTRUCTED PER INSULIN INSTRUCTIONS, Disp: 30 mL, Rfl: 2    blood sugar diagnostic (OneTouch Verio test strips) strip, TEST 2 TIMES DAILY, Disp: , Rfl:     dicyclomine (Bentyl) 10 mg capsule, Take 1 capsule (10 mg) by mouth 4 times a day before meals. Take with meals and at bedtime, Disp: 120 capsule, Rfl: 11    dulaglutide (Trulicity) 1.5 mg/0.5 mL pen injector injection, Inject 1.5 mg under the skin 1 (one) time per week., Disp: 2 mL, Rfl: 11    fluticasone furoate-vilanteroL (Breo Ellipta) 200-25 mcg/dose inhaler, Inhale once daily., Disp: , Rfl:     ibuprofen 600 mg tablet, Take 1 tablet (600 mg) by mouth 3 times a day., Disp: 90 tablet, Rfl: 0    insulin lispro (HumaLOG KwikPen Insulin) 100 unit/mL injection, Inject 25 Units under the skin 2 times a day., Disp: 15 mL, Rfl: 0    mirtazapine (Remeron) 15 mg tablet, Take 1 tablet (15 mg) by mouth once daily at bedtime., Disp: 30 tablet, Rfl: 0    nystatin (Mycostatin) ointment, , Disp: , Rfl:     omeprazole (PriLOSEC) 40 mg DR capsule, Take 1 capsule (40 mg) by mouth once daily. Do not crush or chew., Disp: 30 capsule, Rfl: 3    ondansetron ODT (Zofran-ODT) 8 mg disintegrating tablet, DISSOLVE ONE TABLET BY MOUTH EVERY 8 HOURS AS NEEDED, Disp: 90 tablet, Rfl: 0    polyethylene glycol (Miralax) 17 gram/dose powder, Mix 17 g  of powder and drink once daily., Disp: 510 g, Rfl: 2    sodium,potassium,mag sulfates (Suprep Bowel Prep Kit) 17.5-3.13-1.6 gram solution, Take one bottle twice as directed by the prep instructions, Disp: 354 mL, Rfl: 0    triamcinolone (Kenalog) 0.1 % cream, Apply topically. Apply to affected areas 2-3 times daily, Disp: , Rfl:

## 2025-06-04 NOTE — PROGRESS NOTES
Patient here for follow up visit with Dr Dima Pino for Dx of   Patient here alone    Medications and Allergies reviewed and reconciled this visit by MD per her request     Pt reports fullness when eating.    Referral for general surgery placed as she needs splenectomy     Pt reports appetite is fair     Fevers/Chills/weight loss/night sweats: denies   Xarelto daily .  T3 for discomfort to abd    Follow up per  MD  request.    Pt. reports availability and use of mychart, Reviewed this is a good place to communicate with the team as well as review labs and upcoming orders. Pt without My Chart encouraged to call office with any questions  or concerns.      No barriers to education noted, patient agrees to current plan and verbalized understanding using teach back method.

## 2025-06-11 RX ORDER — RIMEGEPANT SULFATE 75 MG/75MG
TABLET, ORALLY DISINTEGRATING ORAL
COMMUNITY
Start: 2025-04-16

## 2025-06-11 NOTE — PROGRESS NOTES
ASSESSMENT / PLAN:    Subjective   Patient ID: Yuly Pond is a 49 y.o. female who presents for splenomegaly.    49F referred to General Surgery for splenic infarct.  Patient indicates she has been having LUQ abdominal pain since October 2024.  Had been in [place] over the summer, came back in August; started having pain in September which worsened and became severe by October.  Nausea, pain and diarrhea everyday.  Denies any new medications, trauma, illness.  Patient indicates that she also has left leg swelling recently.  Hx of DVT in left leg.    Imaging reviewed in office with patient: There are 2 wedge-shaped infarcts in the spleen that appear to be stable. There are no blood clots.    CTA abdomen/pelvis:    1. Interval decrease in size of the wedge-shaped hypodensity within the posterior aspect of the spleen compatible with atrophy of the infarcted tissue  2. Questionable new wedge-shaped hypodensity more anteriorly within the posterior aspect  3.  The celiac artery and its 1st order branches are widely patent. This includes the splenic artery and its distal branches which are all widely patent.          CT on 5/22/25 CT angio abdomen pelvis w and or wo IV IV contrast (05/22/2025 16:50)     Review of Systems   Constitutional:  Positive for activity change, appetite change and fatigue.   Gastrointestinal:  Positive for abdominal pain, diarrhea, nausea and vomiting. Negative for blood in stool.   Genitourinary: Negative.    Musculoskeletal:  Positive for arthralgias, back pain and joint swelling.   Skin: Negative.    Neurological: Negative.    Hematological:  Negative for adenopathy. Does not bruise/bleed easily.   Psychiatric/Behavioral: Negative.         Objective   Physical Exam  Vitals and nursing note reviewed.   Constitutional:       General: She is not in acute distress.  HENT:      Head: Normocephalic and atraumatic.      Right Ear: External ear normal.      Left Ear: External ear normal.      Mouth/Throat:  "     Mouth: Mucous membranes are moist.      Pharynx: Oropharynx is clear.   Eyes:      General: No scleral icterus.     Conjunctiva/sclera: Conjunctivae normal.   Cardiovascular:      Rate and Rhythm: Normal rate.      Pulses: Normal pulses.   Pulmonary:      Effort: Pulmonary effort is normal. No respiratory distress.   Abdominal:      General: There is no distension.      Palpations: Abdomen is soft.      Tenderness: There is guarding.   Musculoskeletal:         General: Swelling and tenderness present. No deformity or signs of injury. Normal range of motion.      Cervical back: Normal range of motion. No rigidity.   Skin:     General: Skin is warm.      Findings: No bruising or lesion.   Neurological:      General: No focal deficit present.      Mental Status: She is alert.      Sensory: No sensory deficit.      Motor: No weakness.   Psychiatric:         Mood and Affect: Mood is depressed.         Assessment/Plan   Problem List Items Addressed This Visit           ICD-10-CM    Splenomegaly - Primary R16.1    Relevant Orders    Lower extremity venous duplex left        49F with chronic abdominal pain, localized to LUQ; associated with nausea, vomiting, diarrhea.  Patient has previous hx of LLE DVT, also hx cholecystectomy 30 years ago. No blood clots appreciated on CTA; unclear etiology of splenic infarct.  Review of imaging and labwork does not give a clear etiology of patient's ongoing pain.     This was explained to patient.  A splenectomy unlikely to solve her pain; currently there is not a clear indication for such a morbid procedure like a splenectomy just in the setting of pain.  Patient indicates that she \"understands the repercussions of surgery.\" When I indicated that a surgery like splenectomy may not solve her pain and may make her pain worse, she indicated \"fine, I will just be a guinea pig while we figure this out.\"    Patient is in pain that affects her daily living and is seeking anything that " will help.  Counseled that more information is needed.      Recommending further workup:    Patient needs an EGD and a colonoscopy for further workup.  An EGD is essential for workup, as this pain is LUQ and patient has poorly controlled DM (serum glucose 300s on 5/1/25), among other comorbidities.  This workup may help rule out gastroparesis, PUD, hiatal hernia - all of which are more consistent with chronic LUQ pain, rather than splenic infarct in the setting of patent vasculature.    Erin Fletcher MD 06/16/25 9:51 AM

## 2025-06-16 ENCOUNTER — OFFICE VISIT (OUTPATIENT)
Dept: SURGERY | Facility: CLINIC | Age: 50
End: 2025-06-16
Payer: COMMERCIAL

## 2025-06-16 VITALS
WEIGHT: 230 LBS | HEART RATE: 83 BPM | HEIGHT: 70 IN | OXYGEN SATURATION: 98 % | BODY MASS INDEX: 32.93 KG/M2 | TEMPERATURE: 97.7 F | SYSTOLIC BLOOD PRESSURE: 120 MMHG | DIASTOLIC BLOOD PRESSURE: 80 MMHG

## 2025-06-16 DIAGNOSIS — R16.1 SPLENOMEGALY: Primary | ICD-10-CM

## 2025-06-16 PROCEDURE — 1036F TOBACCO NON-USER: CPT | Performed by: STUDENT IN AN ORGANIZED HEALTH CARE EDUCATION/TRAINING PROGRAM

## 2025-06-16 PROCEDURE — 3074F SYST BP LT 130 MM HG: CPT | Performed by: STUDENT IN AN ORGANIZED HEALTH CARE EDUCATION/TRAINING PROGRAM

## 2025-06-16 PROCEDURE — 3008F BODY MASS INDEX DOCD: CPT | Performed by: STUDENT IN AN ORGANIZED HEALTH CARE EDUCATION/TRAINING PROGRAM

## 2025-06-16 PROCEDURE — 99204 OFFICE O/P NEW MOD 45 MIN: CPT | Performed by: STUDENT IN AN ORGANIZED HEALTH CARE EDUCATION/TRAINING PROGRAM

## 2025-06-16 PROCEDURE — 3079F DIAST BP 80-89 MM HG: CPT | Performed by: STUDENT IN AN ORGANIZED HEALTH CARE EDUCATION/TRAINING PROGRAM

## 2025-06-16 PROCEDURE — 99214 OFFICE O/P EST MOD 30 MIN: CPT | Performed by: STUDENT IN AN ORGANIZED HEALTH CARE EDUCATION/TRAINING PROGRAM

## 2025-06-16 ASSESSMENT — ENCOUNTER SYMPTOMS
BACK PAIN: 1
NEUROLOGICAL NEGATIVE: 1
ADENOPATHY: 0
DIARRHEA: 1
NAUSEA: 1
VOMITING: 1
APPETITE CHANGE: 1
PSYCHIATRIC NEGATIVE: 1
ACTIVITY CHANGE: 1
FATIGUE: 1
ABDOMINAL PAIN: 1
ARTHRALGIAS: 1
BRUISES/BLEEDS EASILY: 0
BLOOD IN STOOL: 0
JOINT SWELLING: 1

## 2025-06-16 ASSESSMENT — PATIENT HEALTH QUESTIONNAIRE - PHQ9
1. LITTLE INTEREST OR PLEASURE IN DOING THINGS: NOT AT ALL
2. FEELING DOWN, DEPRESSED OR HOPELESS: SEVERAL DAYS
SUM OF ALL RESPONSES TO PHQ9 QUESTIONS 1 AND 2: 1

## 2025-06-16 ASSESSMENT — PAIN SCALES - GENERAL: PAINLEVEL_OUTOF10: 3

## 2025-06-24 ENCOUNTER — TELEPHONE (OUTPATIENT)
Dept: HEMATOLOGY/ONCOLOGY | Facility: CLINIC | Age: 50
End: 2025-06-24
Payer: COMMERCIAL

## 2025-06-24 NOTE — TELEPHONE ENCOUNTER
Reason for Conversation  Medication Problem    Background   Ngozi from [facility] patient advocate calling regarding patients Tylenol with codeine not being cover. This was prescribed back on 5/6/25. She is asking if a P2P was done or if we faxed over medical information to support this being refilled. She is asking to speak to a nurse. Pls call Ngozi back at [phone]    Disposition   No disposition on file.

## 2025-06-24 NOTE — TELEPHONE ENCOUNTER
Spoke to Ngozi .  She will fax a copy of the [facility] denial letter to our office. Patient is paying OOP for her Tylenol #3,  and needs a PA completed.  Information is on the denial letter.    Jordyn please check on this.  Muriel

## 2025-07-07 ENCOUNTER — TELEPHONE (OUTPATIENT)
Dept: HEMATOLOGY/ONCOLOGY | Facility: CLINIC | Age: 50
End: 2025-07-07
Payer: COMMERCIAL

## 2025-07-07 DIAGNOSIS — D73.5 SPLENIC INFARCT: Primary | ICD-10-CM

## 2025-07-07 NOTE — TELEPHONE ENCOUNTER
Reason for Conversation  Tylenol Approval    Background   Ngozi from [facility] calling back again. She is following up on if anyone was working on the approval on the Tylenol with codeine for patient. Pls call Ngozi back at [phone]     Disposition   No disposition on file.

## 2025-07-08 NOTE — TELEPHONE ENCOUNTER
Returned call to Ngozi at [employer] who is going to fax to us the denial letter/ info from [employer] re: patient's T3 RX to [phone]. Once received, the pertinent information will be sent to [employer] for approval.

## 2025-07-08 NOTE — TELEPHONE ENCOUNTER
After [facility], request for Tylenol with Codeine is still denied.Denial fax received today at 11:04 am. Dr. Pino has been notified. Per Dr. Dima Pino, order is to be canceled and patient is to have a Palliative care consult  placed and/ or she is to follow up with her PCP . Palliative care consult has been placed. Attempted to reach patient, however her voice mailbox is full and a message could not be left. A message with this information has been sent to her [place].

## 2025-07-08 NOTE — TELEPHONE ENCOUNTER
Received the denial fax from [employer] as requested. All pertinent medical records have been faxed to [facility] [id number], (f) [phone], as directed. Fax  transmittal confirmation received.

## 2025-07-09 ENCOUNTER — TELEPHONE (OUTPATIENT)
Dept: HEMATOLOGY/ONCOLOGY | Facility: CLINIC | Age: 50
End: 2025-07-09
Payer: COMMERCIAL

## 2025-07-09 ENCOUNTER — TELEPHONE (OUTPATIENT)
Dept: SURGERY | Facility: CLINIC | Age: 50
End: 2025-07-09
Payer: COMMERCIAL

## 2025-07-09 NOTE — TELEPHONE ENCOUNTER
Reason for Conversation  call back    Background   Ngozi patients  is calling back to speak to [place]. She is aware [place] is out till Thursday. She states that its regarding the patients Tylenol Rx. Ngozi can be reached at [phone]    Disposition   No disposition on file.

## 2025-07-18 ENCOUNTER — TELEPHONE (OUTPATIENT)
Dept: HEMATOLOGY/ONCOLOGY | Facility: CLINIC | Age: 50
End: 2025-07-18
Payer: COMMERCIAL

## 2025-07-18 NOTE — TELEPHONE ENCOUNTER
Ngozi called she is with a  and said she has been working with Jordyn regarding this pt. She is asking for a call back at [phone]

## 2025-08-05 ENCOUNTER — TELEPHONE (OUTPATIENT)
Dept: GASTROENTEROLOGY | Facility: HOSPITAL | Age: 50
End: 2025-08-05
Payer: COMMERCIAL

## 2025-08-06 DIAGNOSIS — Z12.11 COLON CANCER SCREENING: Primary | ICD-10-CM

## 2025-08-06 RX ORDER — SODIUM, POTASSIUM,MAG SULFATES 17.5-3.13G
0.51 SOLUTION, RECONSTITUTED, ORAL ORAL SEE ADMIN INSTRUCTIONS
Qty: 354 ML | Refills: 0 | Status: SHIPPED | OUTPATIENT
Start: 2025-08-06

## 2025-08-06 NOTE — TELEPHONE ENCOUNTER
Patient called to schedule EGD/Colonoscopy. I am not able to use the orders in the system. Can you please put new orders in the system, so I can schedule her?    Thank you,  Natalie  
Please send bowel prep to [facility]'s pharmacy  
Patient

## 2025-09-05 ENCOUNTER — APPOINTMENT (OUTPATIENT)
Dept: PRIMARY CARE | Facility: CLINIC | Age: 50
End: 2025-09-05
Payer: COMMERCIAL

## 2025-09-08 ENCOUNTER — APPOINTMENT (OUTPATIENT)
Dept: HEMATOLOGY/ONCOLOGY | Facility: CLINIC | Age: 50
End: 2025-09-08
Payer: COMMERCIAL